# Patient Record
Sex: FEMALE | Race: ASIAN | NOT HISPANIC OR LATINO | Employment: UNEMPLOYED | URBAN - METROPOLITAN AREA
[De-identification: names, ages, dates, MRNs, and addresses within clinical notes are randomized per-mention and may not be internally consistent; named-entity substitution may affect disease eponyms.]

---

## 2017-01-31 ENCOUNTER — OFFICE VISIT (OUTPATIENT)
Dept: URGENT CARE | Facility: CLINIC | Age: 37
End: 2017-01-31
Payer: COMMERCIAL

## 2017-01-31 VITALS
RESPIRATION RATE: 20 BRPM | BODY MASS INDEX: 28.66 KG/M2 | TEMPERATURE: 97.8 F | WEIGHT: 183 LBS | OXYGEN SATURATION: 96 % | DIASTOLIC BLOOD PRESSURE: 80 MMHG | HEART RATE: 78 BPM | SYSTOLIC BLOOD PRESSURE: 120 MMHG

## 2017-01-31 DIAGNOSIS — J01.00 ACUTE NON-RECURRENT MAXILLARY SINUSITIS: ICD-10-CM

## 2017-01-31 PROCEDURE — 99203 OFFICE O/P NEW LOW 30 MIN: CPT | Performed by: FAMILY MEDICINE

## 2017-01-31 RX ORDER — AMOXICILLIN AND CLAVULANATE POTASSIUM 875; 125 MG/1; MG/1
1 TABLET, FILM COATED ORAL 2 TIMES DAILY
Qty: 20 TAB | Refills: 0 | Status: SHIPPED | OUTPATIENT
Start: 2017-01-31 | End: 2017-02-10

## 2017-01-31 ASSESSMENT — ENCOUNTER SYMPTOMS
SINUS PRESSURE: 1
DIZZINESS: 1
CHILLS: 0
SWOLLEN GLANDS: 0
HEADACHES: 1
NECK PAIN: 0
SHORTNESS OF BREATH: 0
HOARSE VOICE: 1
COUGH: 0
NAUSEA: 0
FEVER: 0
SORE THROAT: 0
VOMITING: 0

## 2017-01-31 NOTE — MR AVS SNAPSHOT
Keith Carrasco   2017 4:45 PM   Office Visit   MRN: [id number]    Department:  [facility]   Dept Phone:  [phone]    Description:  Female : 1980   Provider:  Silvestre Parisi M.D.           Reason for Visit     Sinus Problem Few wks stuffy nose , sinus headache comes and goes      Allergies as of 2017     No Known Allergies      You were diagnosed with     Acute non-recurrent maxillary sinusitis   [[id number]]         Vital Signs     Blood Pressure Pulse Temperature Respirations Weight Oxygen Saturation    120/80 mmHg 78 36.6 °C (97.8 °F) 20 83.008 kg (183 lb) 96%    Last Menstrual Period Smoking Status                2010 Never Smoker           Basic Information     Date Of Birth Sex Race Ethnicity Preferred Language    1980 Female  Non- English      Problem List              ICD-10-CM Priority Class Noted - Resolved    History of C/S in [place] for Twin Gestation - Desires Repeat only Z98.891   6/10/2010 - Present    SUPERVISION OF HIGH-RISK PREGNANCY    6/10/2010 - Present    Language barrier, cultural differences - Tanzanian Z60.3   6/10/2010 - Present     delivery at 36wk (twin gestation) O60.10X0   6/10/2010 - Present    History of GDM-A1 O24.419   6/10/2010 - Present      Health Maintenance     Patient has no pending health maintenance at this time      Current Immunizations     Influenza TIV (IM) 1/3/2011  5:07 AM    Tdap Vaccine 1/3/2011  5:07 AM      Below and/or attached are the medications your provider expects you to take. Review all of your home medications and newly ordered medications with your provider and/or pharmacist. Follow medication instructions as directed by your provider and/or pharmacist. Please keep your medication list with you and share with your provider. Update the information when medications are discontinued, doses are changed, or new medications (including over-the-counter products) are added; and carry medication  information at all times in the event of emergency situations     Allergies:  No Known Allergies          Medications  Valid as of: January 31, 2017 -  5:08 PM    Generic Name Brand Name Tablet Size Instructions for use    Amoxicillin-Pot Clavulanate (Tab) AUGMENTIN 875-125 MG Take 1 Tab by mouth 2 times a day for 10 days.        MetroNIDAZOLE (Tab) FLAGYL 500 MG Take 1 Tab by mouth 3 times a day.        Ondansetron HCl (Tab) ZOFRAN 4 MG Take 1 Tab by mouth every 8 hours as needed for Nausea/Vomiting.        Oxycodone-Acetaminophen (Tab) PERCOCET 5-325 MG Take 1-2 Tabs by mouth every four hours as needed.        Pediatric Multivitamins-Iron (Chew Tab) FLINTSTONES PLUS IRON  Take  by mouth.        Prenat w/o H-BM-Acuiq-FA-Omega (Cap) PRENATE ESSENTIAL 28-0.6-0.4-340 MG Take 30 Caps by mouth 1 time daily as needed.        Prenatal Vit-Fe Fumarate-FA (Tab) STUARTNATAL 1+1 27-1 MG Take 1 Tab by mouth every day.        .                 Medicines prescribed today were sent to:     [id number] - [place] [facility] [address] [facility]    [address] [facility] [place] [address]    Phone: [phone] Fax: [phone]    Open 24 Hours?: No      Medication refill instructions:       If your prescription bottle indicates you have medication refills left, it is not necessary to call your provider’s office. Please contact your pharmacy and they will refill your medication.    If your prescription bottle indicates you do not have any refills left, you may request refills at any time through one of the following ways: The online [employer] system (except Urgent Care), by calling your provider’s office, or by asking your pharmacy to contact your provider’s office with a refill request. Medication refills are processed only during regular business hours and may not be available until the next business day. Your provider may request additional information or to have a follow-up visit with you prior to refilling your medication.   *Please  Note: Medication refills are assigned a new Rx number when refilled electronically. Your pharmacy may indicate that no refills were authorized even though a new prescription for the same medication is available at the pharmacy. Please request the medicine by name with the pharmacy before contacting your provider for a refill.           [employer] Access Code: [email or web address]  Expires: 3/2/2017  5:08 PM    Your email address is not on file at [facility].  Email Addresses are required for you to sign up for [employer], please contact [phone] to verify your personal information and to provide your email address prior to attempting to register for [employer].    Keith Carrasco  [address] [place] [address]    [employer]  A secure, online tool to manage your health information     [facility]’s [employer]® is a secure, online tool that connects you to your personalized health information from the privacy of your home -- day or night - making it very easy for you to manage your healthcare. Once the activation process is completed, you can even access your medical information using the [employer] amy, which is available for free in the Apple Amy store or Google Play store.     To learn more about [employer], visit [email or web address]    There are two levels of access available (as shown below):   My Chart Features  [facility]  Specialists [facility]  Urgent  Care Non-[facility] Primary Care Doctor   Email your healthcare team securely and privately 24/7 X X X    Manage appointments: schedule your next appointment; view details of past/upcoming appointments X      Request prescription refills. X      View recent personal medical records, including lab and immunizations X X X X   View health record, including health history, allergies, medications X X X X   Read reports about your outpatient visits, procedures, consult and ER notes X X X X   See your discharge summary, which is a recap of your hospital  and/or ER visit that includes your diagnosis, lab results, and care plan X X  X     How to register for [employer]:  Once your e-mail address has been verified, follow the following steps to sign up for [employer].     1. Go to  [email or web address]  2. Click on the Sign Up Now box, which takes you to the New Member Sign Up page. You will need to provide the following information:  a. Enter your [employer] Access Code exactly as it appears at the top of this page. (You will not need to use this code after you’ve completed the sign-up process. If you do not sign up before the expiration date, you must request a new code.)   b. Enter your date of birth.   c. Enter your home email address.   d. Click Submit, and follow the next screen’s instructions.  3. Create a [employer] ID. This will be your [employer] login ID and cannot be changed, so think of one that is secure and easy to remember.  4. Create a [employer] password. You can change your password at any time.  5. Enter your Password Reset Question and Answer. This can be used at a later time if you forget your password.   6. Enter your e-mail address. This allows you to receive e-mail notifications when new information is available in [employer].  7. Click Sign Up. You can now view your health information.    For assistance activating your [employer] account, call [phone]

## 2017-02-01 NOTE — PROGRESS NOTES
Subjective:      Keith Carrasco is a 36 y.o. female who presents with Sinus Problem            Sinus Problem  This is a new problem. The current episode started 1 to 4 weeks ago (2-3 weeks). The problem is unchanged. There has been no fever. Her pain is at a severity of 3/10. The pain is mild. Associated symptoms include congestion, headaches, a hoarse voice, sinus pressure and sneezing. Pertinent negatives include no chills, coughing, ear pain, neck pain, shortness of breath, sore throat or swollen glands. Treatments tried: claritin. The treatment provided mild relief.       Review of Systems   Constitutional: Negative for fever and chills.   HENT: Positive for congestion, hoarse voice, sinus pressure and sneezing. Negative for ear pain and sore throat.    Respiratory: Negative for cough and shortness of breath.    Gastrointestinal: Negative for nausea and vomiting.   Musculoskeletal: Negative for neck pain.   Skin: Negative for rash.   Neurological: Positive for dizziness and headaches.     PMH:  has a past medical history of Allergy.  MEDS:   Current outpatient prescriptions:   •  oxycodone-acetaminophen (PERCOCET) 5-325 MG TABS, Take 1-2 Tabs by mouth every four hours as needed., Disp: , Rfl:   •  Prenat w/o G-TN-Eygud-FA-Omega (PRENATE ESSENTIAL) 28-0.6-0.4-340 MG CAPS, Take 30 Caps by mouth 1 time daily as needed., Disp: 30 Cap, Rfl: 5  •  metronidazole (FLAGYL) 500 MG TABS, Take 1 Tab by mouth 3 times a day., Disp: 14 Each, Rfl: 0  •  ondansetron (ZOFRAN) 4 MG TABS, Take 1 Tab by mouth every 8 hours as needed for Nausea/Vomiting., Disp: 20 Each, Rfl: 1  •  Pediatric Multivitamins-Iron (FLINTSTONES PLUS IRON) CHEW, Take  by mouth., Disp: , Rfl:   •  prenatal multivitamin (STUARTNATAL 1+1) 27-1 MG TABS, Take 1 Tab by mouth every day., Disp: 90 Each, Rfl: 5  ALLERGIES: No Known Allergies  SURGHX:   Past Surgical History   Procedure Laterality Date   • Primary c section  2007   • Repeat c section  12/30/2010      Performed by LAILA CIFUNETES at [facility]     SOCHX:  reports that she has never smoked. She does not have any smokeless tobacco history on file. She reports that she does not drink alcohol or use illicit drugs.  FH: Family history was reviewed, no pertinent findings to report       Objective:     /80 mmHg  Pulse 78  Temp(Src) 36.6 °C (97.8 °F)  Resp 20  Wt 83.008 kg (183 lb)  SpO2 96%  LMP 04/05/2010     Physical Exam   Constitutional: She appears well-developed.   HENT:   Head: Normocephalic.   Right Ear: External ear normal.   Left Ear: External ear normal.   Mouth/Throat: Oropharyngeal exudate present.   Mild sinus tenderness, Nasal congestion   Eyes: Pupils are equal, round, and reactive to light. Right eye exhibits no discharge. Left eye exhibits no discharge.   Neck: Neck supple. No thyromegaly present.   Cardiovascular: Normal rate.  Exam reveals no friction rub.    No murmur heard.  Pulmonary/Chest: Effort normal. No respiratory distress. She has no wheezes.   Abdominal: Soft. She exhibits no distension. There is no tenderness. There is no guarding.   Lymphadenopathy:     She has no cervical adenopathy.   Neurological: She is alert.   Skin: Skin is warm and dry. No erythema.   Psychiatric: She has a normal mood and affect. Her behavior is normal.               Assessment/Plan:     1. Acute non-recurrent maxillary sinusitis  amoxicillin-clavulanate (AUGMENTIN) 875-125 MG Tab     Patient was given a contingent antibiotic prescription to fill and use as directed if symptoms progressed as discussed and agreed upon.  Try flonase for 72 hrs and take abx if sxs not improving    Supportive care  Push fluids  Monitor temperature  Follow-up if symptoms worsen or fail to improve

## 2018-04-26 ENCOUNTER — OFFICE VISIT (OUTPATIENT)
Dept: MEDICAL GROUP | Age: 38
End: 2018-04-26
Payer: COMMERCIAL

## 2018-04-26 VITALS
BODY MASS INDEX: 29.03 KG/M2 | TEMPERATURE: 98.4 F | WEIGHT: 185 LBS | DIASTOLIC BLOOD PRESSURE: 66 MMHG | OXYGEN SATURATION: 98 % | HEART RATE: 78 BPM | HEIGHT: 67 IN | SYSTOLIC BLOOD PRESSURE: 108 MMHG

## 2018-04-26 DIAGNOSIS — E78.00 PURE HYPERCHOLESTEROLEMIA: ICD-10-CM

## 2018-04-26 DIAGNOSIS — Z00.00 PREVENTATIVE HEALTH CARE: ICD-10-CM

## 2018-04-26 DIAGNOSIS — J30.1 ACUTE SEASONAL ALLERGIC RHINITIS DUE TO POLLEN: ICD-10-CM

## 2018-04-26 PROBLEM — J30.2 ACUTE SEASONAL ALLERGIC RHINITIS: Status: ACTIVE | Noted: 2018-04-26

## 2018-04-26 PROCEDURE — 99213 OFFICE O/P EST LOW 20 MIN: CPT | Performed by: FAMILY MEDICINE

## 2018-04-26 RX ORDER — PREDNISONE 20 MG/1
TABLET ORAL
Qty: 12 TAB | Refills: 0 | Status: SHIPPED
Start: 2018-04-26 | End: 2020-02-15

## 2018-04-26 RX ORDER — OLOPATADINE HYDROCHLORIDE 1 MG/ML
1 SOLUTION/ DROPS OPHTHALMIC 2 TIMES DAILY
Qty: 5 ML | Refills: 2 | Status: SHIPPED
Start: 2018-04-26 | End: 2020-02-15

## 2018-04-26 ASSESSMENT — PATIENT HEALTH QUESTIONNAIRE - PHQ9: CLINICAL INTERPRETATION OF PHQ2 SCORE: 0

## 2018-04-26 NOTE — PROGRESS NOTES
This medical record contains text that has been entered with the assistance of computer voice recognition and dictation software.  Therefore, it may contain unintended errors in text, spelling, punctuation, or grammar    Chief Complaint   Patient presents with   • Establish Care   • Dry Mouth       Keith Carrasco is a 37 y.o. female here evaluation and management of: establish care, seasonal allergies      HPI:     Preventative health care  The patient is a 37-year-old female, , because of twins. She presents [facility] to [facility]. She has a significant past medical history of obesity, and seasonal allergies.   The patient denied any chest pain, no sob, no villarreal, no  pnd, no orthopnea, no headache, no changes in vision, no numbness or tingling, no nausea, no diarrhea, no abdominal pain, no fevers, no chills, no bright red blood per rectum, no  difficulty urinating, no burning during micturition, no depressed mood, no other concerns.      Acute seasonal allergic rhinitis  The patient states that during her menstruation her right eye becomes red and Hurst allergies worsen. She denies any headaches no heavy menstrual bleeding.    Current medicines (including changes today)  Current Outpatient Prescriptions   Medication Sig Dispense Refill   • Loratadine (CLARITIN PO) Take  by mouth.     • olopatadine (PATANOL) 0.1 % ophthalmic solution Place 1 Drop in both eyes 2 times a day. 5 mL 2   • predniSONE (DELTASONE) 20 MG Tab Take 3 tabs po for 2 days then 2 tabs for 2 days then 1 for 2 days 12 Tab 0   • oxycodone-acetaminophen (PERCOCET) 5-325 MG TABS Take 1-2 Tabs by mouth every four hours as needed.     • Prenat w/o I-DI-Oemwt-FA-Omega (PRENATE ESSENTIAL) 28-0.6-0.4-340 MG CAPS Take 30 Caps by mouth 1 time daily as needed. 30 Cap 5   • metronidazole (FLAGYL) 500 MG TABS Take 1 Tab by mouth 3 times a day. 14 Each 0   • ondansetron (ZOFRAN) 4 MG TABS Take 1 Tab by mouth every 8 hours as needed for Nausea/Vomiting. 20  "Each 1   • Pediatric Multivitamins-Iron (FLINTSTONES PLUS IRON) CHEW Take  by mouth.     • prenatal multivitamin (STUARTNATAL 1+1) 27-1 MG TABS Take 1 Tab by mouth every day. 90 Each 5     No current facility-administered medications for this visit.      She  has a past medical history of Allergy.  She  has a past surgical history that includes primary c section (2007) and repeat c section (12/30/2010).  Social History   Substance Use Topics   • Smoking status: Never Smoker   • Smokeless tobacco: Never Used   • Alcohol use No     Social History     Social History Narrative   • No narrative on file     No family history on file.  Family Status   Relation Status   • Mother Alive   • Father Alive         ROS    Please see hpi     All other systems reviewed and are negative     Objective:     Blood pressure 108/66, pulse 78, temperature 36.9 °C (98.4 °F), height 1.702 m (5' 7\"), weight 83.9 kg (185 lb), last menstrual period 04/18/2018, SpO2 98 %, not currently breastfeeding. Body mass index is 28.98 kg/m².  Physical Exam:    Constitutional: Alert, no distress.  Skin: Warm, dry, good turgor, no rashes in visible areas.  Eye: Equal, round and reactive, conjunctiva clear, lids normal.  ENMT: Lips without lesions, good dentition, oropharynx clear.  Neck: Trachea midline, no masses, no thyromegaly. No cervical or supraclavicular lymphadenopathy.  Respiratory: Unlabored respiratory effort, lungs clear to auscultation, no wheezes, no ronchi.  Cardiovascular: Normal S1, S2, no murmur, no edema.  Abdomen: Soft, non-tender, no masses, no hepatosplenomegaly.  Psych: Alert and oriented x3, normal affect and mood.          Assessment and Plan:   The following treatment plan was discussed      1. Preventative health care  Care has been established  We need baseline labs to establish a clinical profile  We reviewed USPSTF guidelines    Requested Medical records to be sent to us  Denies intimate partner viloence        2. Acute " seasonal allergic rhinitis due to pollen  I explained the importance of preventing antibiotic resistance  Educated patient that Washing the nasal cavities with saline reduces postnasal drainage, removes secretions, and rinses away allergens and irritants.  Patient was encouraged to use nasal saline rinses prior to using intranasal steroid  Will add Zyrtec and steroid taper   Oral prednisone  20 mg twice daily for five days, followed by 20 mg daily for five days (ie, total of 10 days of treatment).    If symptoms persist will consider antibiotics  And Leukotriene D4 (LTD4) receptor blockers including montelukast or zafirlukast     - olopatadine (PATANOL) 0.1 % ophthalmic solution; Place 1 Drop in both eyes 2 times a day.  Dispense: 5 mL; Refill: 2  - predniSONE (DELTASONE) 20 MG Tab; Take 3 tabs po for 2 days then 2 tabs for 2 days then 1 for 2 days  Dispense: 12 Tab; Refill: 0    3. Pure hypercholesterolemia  Need new labs    - CBC WITH DIFFERENTIAL; Future  - LIPID PROFILE; Future  - COMP METABOLIC PANEL; Future        HEALTH MAINTENANCE:    Instructed to Follow up in clinic or ER for worsening symptoms, difficulty breathing, lack of expected recovery, or should new symptoms or problems arise.    Followup: Return in about 3 months (around 7/26/2018) for Reevaluation.       Once again this medical record contains text that has been entered with the assistance of computer voice recognition and dictation software.  Therefore, it may contain unintended errors in text, spelling, punctuation, or grammar

## 2018-04-26 NOTE — ASSESSMENT & PLAN NOTE
The patient states that during her menstruation her right eye becomes red and Hurst allergies worsen. She denies any headaches no heavy menstrual bleeding.

## 2018-04-28 ENCOUNTER — HOSPITAL ENCOUNTER (OUTPATIENT)
Dept: LAB | Facility: MEDICAL CENTER | Age: 38
End: 2018-04-28
Attending: FAMILY MEDICINE
Payer: COMMERCIAL

## 2018-04-28 DIAGNOSIS — E78.00 PURE HYPERCHOLESTEROLEMIA: ICD-10-CM

## 2018-04-28 LAB
ALBUMIN SERPL BCP-MCNC: 4.3 G/DL (ref 3.2–4.9)
ALBUMIN/GLOB SERPL: 1.2 G/DL
ALP SERPL-CCNC: 63 U/L (ref 30–99)
ALT SERPL-CCNC: 12 U/L (ref 2–50)
ANION GAP SERPL CALC-SCNC: 6 MMOL/L (ref 0–11.9)
AST SERPL-CCNC: 14 U/L (ref 12–45)
BASOPHILS # BLD AUTO: 0.3 % (ref 0–1.8)
BASOPHILS # BLD: 0.03 K/UL (ref 0–0.12)
BILIRUB SERPL-MCNC: 0.5 MG/DL (ref 0.1–1.5)
BUN SERPL-MCNC: 13 MG/DL (ref 8–22)
CALCIUM SERPL-MCNC: 9.4 MG/DL (ref 8.5–10.5)
CHLORIDE SERPL-SCNC: 106 MMOL/L (ref 96–112)
CHOLEST SERPL-MCNC: 148 MG/DL (ref 100–199)
CO2 SERPL-SCNC: 24 MMOL/L (ref 20–33)
CREAT SERPL-MCNC: 0.72 MG/DL (ref 0.5–1.4)
EOSINOPHIL # BLD AUTO: 0.01 K/UL (ref 0–0.51)
EOSINOPHIL NFR BLD: 0.1 % (ref 0–6.9)
ERYTHROCYTE [DISTWIDTH] IN BLOOD BY AUTOMATED COUNT: 45.4 FL (ref 35.9–50)
GLOBULIN SER CALC-MCNC: 3.5 G/DL (ref 1.9–3.5)
GLUCOSE SERPL-MCNC: 106 MG/DL (ref 65–99)
HCT VFR BLD AUTO: 43.4 % (ref 37–47)
HDLC SERPL-MCNC: 56 MG/DL
HGB BLD-MCNC: 13.7 G/DL (ref 12–16)
IMM GRANULOCYTES # BLD AUTO: 0.03 K/UL (ref 0–0.11)
IMM GRANULOCYTES NFR BLD AUTO: 0.3 % (ref 0–0.9)
LDLC SERPL CALC-MCNC: 85 MG/DL
LYMPHOCYTES # BLD AUTO: 2.35 K/UL (ref 1–4.8)
LYMPHOCYTES NFR BLD: 22.4 % (ref 22–41)
MCH RBC QN AUTO: 29.3 PG (ref 27–33)
MCHC RBC AUTO-ENTMCNC: 31.6 G/DL (ref 33.6–35)
MCV RBC AUTO: 92.7 FL (ref 81.4–97.8)
MONOCYTES # BLD AUTO: 0.54 K/UL (ref 0–0.85)
MONOCYTES NFR BLD AUTO: 5.1 % (ref 0–13.4)
NEUTROPHILS # BLD AUTO: 7.55 K/UL (ref 2–7.15)
NEUTROPHILS NFR BLD: 71.8 % (ref 44–72)
NRBC # BLD AUTO: 0 K/UL
NRBC BLD-RTO: 0 /100 WBC
PLATELET # BLD AUTO: 450 K/UL (ref 164–446)
PMV BLD AUTO: 10.9 FL (ref 9–12.9)
POTASSIUM SERPL-SCNC: 3.9 MMOL/L (ref 3.6–5.5)
PROT SERPL-MCNC: 7.8 G/DL (ref 6–8.2)
RBC # BLD AUTO: 4.68 M/UL (ref 4.2–5.4)
SODIUM SERPL-SCNC: 136 MMOL/L (ref 135–145)
TRIGL SERPL-MCNC: 37 MG/DL (ref 0–149)
WBC # BLD AUTO: 10.5 K/UL (ref 4.8–10.8)

## 2018-04-28 PROCEDURE — 80053 COMPREHEN METABOLIC PANEL: CPT

## 2018-04-28 PROCEDURE — 36415 COLL VENOUS BLD VENIPUNCTURE: CPT

## 2018-04-28 PROCEDURE — 85025 COMPLETE CBC W/AUTO DIFF WBC: CPT

## 2018-04-28 PROCEDURE — 80061 LIPID PANEL: CPT

## 2018-05-02 ENCOUNTER — TELEPHONE (OUTPATIENT)
Dept: MEDICAL GROUP | Age: 38
End: 2018-05-02

## 2018-05-02 NOTE — TELEPHONE ENCOUNTER
----- Message from Billy Bello M.D. sent at 5/2/2018 11:22 AM PDT -----  Please call patient and inform her that she is  in the PRE-Diabetes level also known as impaired fasting glucose (IFG). Changes in lifestyle, including diet modification, weight loss, and exercise slow progression of IFG to overt diabetes. Let's give you 3 months of life some modification and then repeat the labs.    Billy Heck MD  Diplomat, [facility]  [address] [place] [phone]

## 2018-05-02 NOTE — TELEPHONE ENCOUNTER
Phone Number Called: [phone] (home)       Message: left a voice mail to pt    Left Message for patient to call back: yes

## 2018-05-03 NOTE — TELEPHONE ENCOUNTER
Phone Number Called: [phone] (home)       Message: informed pt    Left Message for patient to call back: no

## 2018-10-26 ENCOUNTER — OFFICE VISIT (OUTPATIENT)
Dept: URGENT CARE | Facility: CLINIC | Age: 38
End: 2018-10-26
Payer: COMMERCIAL

## 2018-10-26 VITALS
OXYGEN SATURATION: 100 % | HEART RATE: 77 BPM | RESPIRATION RATE: 16 BRPM | TEMPERATURE: 98.6 F | DIASTOLIC BLOOD PRESSURE: 76 MMHG | SYSTOLIC BLOOD PRESSURE: 112 MMHG | BODY MASS INDEX: 29.19 KG/M2 | HEIGHT: 67 IN | WEIGHT: 186 LBS

## 2018-10-26 DIAGNOSIS — R50.81 FEVER IN OTHER DISEASES: ICD-10-CM

## 2018-10-26 DIAGNOSIS — R10.32 LLQ PAIN: ICD-10-CM

## 2018-10-26 LAB
APPEARANCE UR: CLEAR
BILIRUB UR STRIP-MCNC: NORMAL MG/DL
COLOR UR AUTO: YELLOW
GLUCOSE UR STRIP.AUTO-MCNC: NORMAL MG/DL
KETONES UR STRIP.AUTO-MCNC: NORMAL MG/DL
LEUKOCYTE ESTERASE UR QL STRIP.AUTO: NORMAL
NITRITE UR QL STRIP.AUTO: NORMAL
PH UR STRIP.AUTO: 6 [PH] (ref 5–8)
PROT UR QL STRIP: NORMAL MG/DL
RBC UR QL AUTO: NORMAL
SP GR UR STRIP.AUTO: 1.01
UROBILINOGEN UR STRIP-MCNC: 0.2 MG/DL

## 2018-10-26 PROCEDURE — 99213 OFFICE O/P EST LOW 20 MIN: CPT | Performed by: NURSE PRACTITIONER

## 2018-10-26 PROCEDURE — 81002 URINALYSIS NONAUTO W/O SCOPE: CPT | Performed by: NURSE PRACTITIONER

## 2018-10-26 ASSESSMENT — ENCOUNTER SYMPTOMS
SEIZURES: 0
SHORTNESS OF BREATH: 0
SENSORY CHANGE: 0
LOSS OF CONSCIOUSNESS: 0
CARDIOVASCULAR NEGATIVE: 1
MYALGIAS: 0
EYES NEGATIVE: 1
WHEEZING: 0
DIARRHEA: 0
VOMITING: 0
PHOTOPHOBIA: 0
NECK PAIN: 0
CHILLS: 0
WEAKNESS: 0
CONSTIPATION: 0
NAUSEA: 1
BLURRED VISION: 0
FEVER: 1
HEADACHES: 0
BACK PAIN: 0
RESPIRATORY NEGATIVE: 1
DOUBLE VISION: 0
ABDOMINAL PAIN: 0
SORE THROAT: 0
FOCAL WEAKNESS: 0
DIAPHORESIS: 0
NEUROLOGICAL NEGATIVE: 1
DIZZINESS: 0
FLANK PAIN: 0

## 2018-10-27 NOTE — PROGRESS NOTES
Subjective:   Keith Carrasco is a 38 y.o. female who presents for Fever        HPI   Patient complains of new onset fever and nausea x1 week. She has tried taking OTC Tylenol for fever with some relief. Denies any runny nose, cough, recent colds, sore throat, ear pain or sinus pressure.  States that symptoms have become mildly worse.  She is currently on her menses.  Patient able to tolerate fluids and food.    Denies history of diverticulitis,or colon problems.  Denies any urinary symptoms, such as frequency or burning  She does have a h/o gestational diabetes.    Denies chest pain, shortness of breath or wheezing.  Denies vomiting, diarrhea, or constipation.    Review of Systems   Constitutional: Positive for fever. Negative for chills, diaphoresis and malaise/fatigue.   HENT: Negative for congestion, ear discharge, ear pain, nosebleeds and sore throat.    Eyes: Negative.  Negative for blurred vision, double vision and photophobia.   Respiratory: Negative.  Negative for shortness of breath and wheezing.    Cardiovascular: Negative.  Negative for chest pain.   Gastrointestinal: Positive for nausea. Negative for abdominal pain, constipation, diarrhea and vomiting.   Genitourinary: Negative for dysuria, flank pain, frequency, hematuria and urgency.   Musculoskeletal: Negative for back pain, myalgias and neck pain.   Skin: Negative.  Negative for itching and rash.   Neurological: Negative.  Negative for dizziness, sensory change, focal weakness, seizures, loss of consciousness, weakness and headaches.   All other systems reviewed and are negative.    No Known Allergies   PMH:  Patient Active Problem List    Diagnosis Date Noted   • Preventative health care 04/26/2018   • Acute seasonal allergic rhinitis 04/26/2018   • Pure hypercholesterolemia 04/26/2018   • History of C/S in [place] for Twin Gestation - Desires Repeat only 06/10/2010   • SUPERVISION OF HIGH-RISK PREGNANCY 06/10/2010   • Language barrier, cultural  "differences -  06/10/2010   •  delivery at 36wk (twin gestation) 06/10/2010   • History of GDM-A1 06/10/2010         MEDS:   Current Outpatient Prescriptions:   •  Loratadine (CLARITIN PO), Take  by mouth., Disp: , Rfl:   •  olopatadine (PATANOL) 0.1 % ophthalmic solution, Place 1 Drop in both eyes 2 times a day., Disp: 5 mL, Rfl: 2  •  predniSONE (DELTASONE) 20 MG Tab, Take 3 tabs po for 2 days then 2 tabs for 2 days then 1 for 2 days, Disp: 12 Tab, Rfl: 0  •  oxycodone-acetaminophen (PERCOCET) 5-325 MG TABS, Take 1-2 Tabs by mouth every four hours as needed., Disp: , Rfl:   •  Prenat w/o A-YR-Rhspx-FA-Omega (PRENATE ESSENTIAL) 28-0.6-0.4-340 MG CAPS, Take 30 Caps by mouth 1 time daily as needed., Disp: 30 Cap, Rfl: 5  •  metronidazole (FLAGYL) 500 MG TABS, Take 1 Tab by mouth 3 times a day., Disp: 14 Each, Rfl: 0  •  ondansetron (ZOFRAN) 4 MG TABS, Take 1 Tab by mouth every 8 hours as needed for Nausea/Vomiting., Disp: 20 Each, Rfl: 1  •  Pediatric Multivitamins-Iron (FLINTSTONES PLUS IRON) CHEW, Take  by mouth., Disp: , Rfl:   •  prenatal multivitamin (STUARTNATAL 1+1) 27-1 MG TABS, Take 1 Tab by mouth every day., Disp: 90 Each, Rfl: 5  ALLERGIES: No Known Allergies  SURGHX:   Past Surgical History:   Procedure Laterality Date   • REPEAT C SECTION  2010    Performed by LAILA CIFUENTES at [facility]   • PRIMARY C SECTION       SOCHX:  reports that she has never smoked. She has never used smokeless tobacco. She reports that she does not drink alcohol or use drugs.  FH: Family history was reviewed, no pertinent findings to report     Objective:   /76 (BP Location: Right arm, Patient Position: Sitting, BP Cuff Size: Small adult)   Pulse 77   Temp 37 °C (98.6 °F) (Temporal)   Resp 16   Ht 1.702 m (5' 7\")   Wt 84.4 kg (186 lb)   SpO2 100%   BMI 29.13 kg/m²   Physical Exam   Constitutional: She is oriented to person, place, and time. She appears well-developed and " well-nourished. She is cooperative.  Non-toxic appearance. She does not have a sickly appearance. She does not appear ill. No distress.   HENT:   Head: Normocephalic.   Right Ear: Hearing and tympanic membrane normal. Tympanic membrane is not erythematous. No middle ear effusion.   Left Ear: Hearing and tympanic membrane normal. Tympanic membrane is not erythematous.  No middle ear effusion.   Nose: No mucosal edema or rhinorrhea. Right sinus exhibits no maxillary sinus tenderness and no frontal sinus tenderness. Left sinus exhibits no maxillary sinus tenderness and no frontal sinus tenderness.   Mouth/Throat: Oropharynx is clear and moist and mucous membranes are normal. No posterior oropharyngeal edema or posterior oropharyngeal erythema. No tonsillar exudate.   Eyes: Pupils are equal, round, and reactive to light. Conjunctivae are normal.   Cardiovascular: Normal rate, regular rhythm and normal heart sounds.    No murmur heard.  Pulmonary/Chest: Effort normal and breath sounds normal. No respiratory distress. She has no decreased breath sounds. She has no wheezes.   Abdominal: Soft. Bowel sounds are normal. She exhibits no distension, no pulsatile liver, no fluid wave, no abdominal bruit, no ascites and no mass. There is no hepatosplenomegaly. There is tenderness in the left lower quadrant. There is no rigidity, no rebound, no guarding, no CVA tenderness, no tenderness at McBurney's point and negative Grant's sign. No hernia.   Mild left lower quadrant tenderness upon deep palpation.   Lymphadenopathy:        Head (right side): No submandibular and no tonsillar adenopathy present.        Head (left side): No submandibular and no tonsillar adenopathy present.   Neurological: She is alert and oriented to person, place, and time.   Skin: Skin is warm and dry. Capillary refill takes less than 2 seconds. She is not diaphoretic.   Psychiatric: She has a normal mood and affect. Her behavior is normal. Judgment and  thought content normal. She is not actively hallucinating. Cognition and memory are normal. She is attentive.   Vitals reviewed.        Assessment/Plan:     1. Fever in other diseases  POCT Urinalysis   2. LLQ pain  CT-ABDOMEN-PELVIS WITH    CBC WITH DIFFERENTIAL    COMP METABOLIC PANEL       UA normal.    Due to unavailability of CT scan, would like to patient to go to ER for further workup.  Patient does not want to go to ER and would like to schedule CT outpatient, as she feels this is not urgent. No current signs of acute sepsis    Ordered labs and CT to be completed by Sunday. Will review results with patient.    Keep appointment with PCP on 10/30.    Discussed signs and symptoms of when to go to ER - increasing fever, nausea, vomiting, diarrhea, etc.    Differential diagnosis, natural history, supportive care, and indications for immediate follow-up discussed.     The case was discussed and reviewed with Dr Parrish during Shoshana RAMOS's training period.

## 2018-10-30 ENCOUNTER — OFFICE VISIT (OUTPATIENT)
Dept: MEDICAL GROUP | Age: 38
End: 2018-10-30
Payer: COMMERCIAL

## 2018-10-30 VITALS
WEIGHT: 189 LBS | OXYGEN SATURATION: 98 % | TEMPERATURE: 102.4 F | SYSTOLIC BLOOD PRESSURE: 102 MMHG | HEART RATE: 109 BPM | DIASTOLIC BLOOD PRESSURE: 66 MMHG | BODY MASS INDEX: 29.66 KG/M2 | HEIGHT: 67 IN

## 2018-10-30 DIAGNOSIS — J06.9 VIRAL UPPER RESPIRATORY TRACT INFECTION: ICD-10-CM

## 2018-10-30 DIAGNOSIS — J40 BRONCHITIS: ICD-10-CM

## 2018-10-30 LAB
FLUAV+FLUBV AG SPEC QL IA: NORMAL
INT CON NEG: NEGATIVE
INT CON POS: POSITIVE

## 2018-10-30 PROCEDURE — 99213 OFFICE O/P EST LOW 20 MIN: CPT | Performed by: FAMILY MEDICINE

## 2018-10-30 PROCEDURE — 87804 INFLUENZA ASSAY W/OPTIC: CPT | Performed by: FAMILY MEDICINE

## 2018-10-30 RX ORDER — PREDNISONE 20 MG/1
TABLET ORAL
Qty: 12 TAB | Refills: 0 | Status: SHIPPED
Start: 2018-10-30 | End: 2020-02-15

## 2018-10-30 RX ORDER — IBUPROFEN 400 MG/1
400 TABLET ORAL EVERY 6 HOURS PRN
Qty: 30 TAB | Refills: 0 | Status: SHIPPED | OUTPATIENT
Start: 2018-10-30 | End: 2022-10-20

## 2018-10-30 RX ORDER — AZITHROMYCIN 250 MG/1
TABLET, FILM COATED ORAL
Qty: 6 TAB | Refills: 0 | Status: SHIPPED | OUTPATIENT
Start: 2018-10-30 | End: 2018-11-04

## 2018-10-30 NOTE — ASSESSMENT & PLAN NOTE
The patient states that for the past 2 weeks she has been having fevers of 103 °F associated with generalized malaise.  She also complains of shortness of breath chest congestion and frontal sinus congestion.  She had a cough productive of greenish sputum as well.  She was recently seen in urgent care and was given conservative management she states she has been compliant with this but does not feel any better and stab feels worse.  Continues to have fevers about 103 °F stiffness no numbness or tingling no nausea no vomiting.  She states all of this started after her menstrual cycle.  She did not obtain the flu vaccine this year.

## 2018-10-30 NOTE — PROGRESS NOTES
This medical record contains text that has been entered with the assistance of computer voice recognition and dictation software.  Therefore, it may contain unintended errors in text, spelling, punctuation, or grammar    Chief Complaint   Patient presents with   • Chills     x1.5 weeks   • Headache     x1.5 weeks         Keith Carrasco is a 38 y.o. female here evaluation and management of: feeling sick      HPI:     Bronchitis  The patient states that for the past 2 weeks she has been having fevers of 103 °F associated with generalized malaise.  She also complains of shortness of breath chest congestion and frontal sinus congestion.  She had a cough productive of greenish sputum as well.  She was recently seen in urgent care and was given conservative management she states she has been compliant with this but does not feel any better and stab feels worse.  Continues to have fevers about 103 °F stiffness no numbness or tingling no nausea no vomiting.  She states all of this started after her menstrual cycle.  She did not obtain the flu vaccine this year.    Current medicines (including changes today)  Current Outpatient Prescriptions   Medication Sig Dispense Refill   • azithromycin (ZITHROMAX) 250 MG Tab 2 tabs by mouth day 1, 1 tab by mouth days 2-5 6 Tab 0   • ibuprofen (MOTRIN) 400 MG Tab Take 1 Tab by mouth every 6 hours as needed. 30 Tab 0   • predniSONE (DELTASONE) 20 MG Tab Take 3 tabs po for 2 days then 2 tabs for 2 days then 1 for 2 days 12 Tab 0   • olopatadine (PATANOL) 0.1 % ophthalmic solution Place 1 Drop in both eyes 2 times a day. 5 mL 2   • Loratadine (CLARITIN PO) Take  by mouth.     • predniSONE (DELTASONE) 20 MG Tab Take 3 tabs po for 2 days then 2 tabs for 2 days then 1 for 2 days (Patient not taking: Reported on 10/30/2018) 12 Tab 0   • oxycodone-acetaminophen (PERCOCET) 5-325 MG TABS Take 1-2 Tabs by mouth every four hours as needed.     • Prenat w/o W-RF-Rmktk-FA-Omega (PRENATE ESSENTIAL)  "28-0.6-0.4-340 MG CAPS Take 30 Caps by mouth 1 time daily as needed. 30 Cap 5   • metronidazole (FLAGYL) 500 MG TABS Take 1 Tab by mouth 3 times a day. 14 Each 0   • ondansetron (ZOFRAN) 4 MG TABS Take 1 Tab by mouth every 8 hours as needed for Nausea/Vomiting. 20 Each 1   • Pediatric Multivitamins-Iron (FLINTSTONES PLUS IRON) CHEW Take  by mouth.     • prenatal multivitamin (STUARTNATAL 1+1) 27-1 MG TABS Take 1 Tab by mouth every day. 90 Each 5     No current facility-administered medications for this visit.      She  has a past medical history of Allergy.  She  has a past surgical history that includes primary c section (2007) and repeat c section (12/30/2010).  Social History   Substance Use Topics   • Smoking status: Never Smoker   • Smokeless tobacco: Never Used   • Alcohol use No     Social History     Social History Narrative   • No narrative on file     No family history on file.  Family Status   Relation Status   • Mo Alive   • Fa Alive         ROS    Please see hpi     All other systems reviewed and are negative     Objective:     Blood pressure 102/66, pulse (!) 109, temperature (!) 39.1 °C (102.4 °F), temperature source Temporal, height 1.702 m (5' 7\"), weight 85.7 kg (189 lb), SpO2 98 %, unknown if currently breastfeeding. Body mass index is 29.6 kg/m².  Physical Exam:    Constitutional: Alert, no distress.  Skin: Warm, dry, good turgor, no rashes in visible areas.  Eye: Equal, round and reactive, conjunctiva clear, lids normal.  ENMT: Lips without lesions, good dentition, oropharynx clear.  Neck: Trachea midline, no masses, no thyromegaly. No cervical or supraclavicular lymphadenopathy.  Respiratory: Unlabored respiratory effort, lungs clear to auscultation, no wheezes, no ronchi.  Cardiovascular: Normal S1, S2, no murmur, no edema.  Abdomen: Soft, non-tender, no masses, no hepatosplenomegaly.  Psych: Alert and oriented x3, normal affect and mood.          Assessment and Plan:   The following treatment " plan was discussed    1. Bronchitis    Since symptoms have persisted for 2 weeks  She feels extremely bad  We will proceed with macrolide      - POCT Influenza A/B    - azithromycin (ZITHROMAX) 250 MG Tab; 2 tabs by mouth day 1, 1 tab by mouth days 2-5  Dispense: 6 Tab; Refill: 0  - ibuprofen (MOTRIN) 400 MG Tab; Take 1 Tab by mouth every 6 hours as needed.  Dispense: 30 Tab; Refill: 0  - predniSONE (DELTASONE) 20 MG Tab; Take 3 tabs po for 2 days then 2 tabs for 2 days then 1 for 2 days  Dispense: 12 Tab; Refill: 0        HEALTH MAINTENANCE:    Instructed to Follow up in clinic or ER for worsening symptoms, difficulty breathing, lack of expected recovery, or should new symptoms or problems arise.    Followup: Return in about 4 weeks (around 11/27/2018) for Reevaluation.       Once again this medical record contains text that has been entered with the assistance of computer voice recognition and dictation software.  Therefore, it may contain unintended errors in text, spelling, punctuation, or grammar

## 2020-02-15 ENCOUNTER — OFFICE VISIT (OUTPATIENT)
Dept: URGENT CARE | Facility: CLINIC | Age: 40
End: 2020-02-15

## 2020-02-15 VITALS
RESPIRATION RATE: 16 BRPM | WEIGHT: 180 LBS | SYSTOLIC BLOOD PRESSURE: 106 MMHG | BODY MASS INDEX: 26.66 KG/M2 | OXYGEN SATURATION: 96 % | DIASTOLIC BLOOD PRESSURE: 68 MMHG | TEMPERATURE: 99.2 F | HEART RATE: 124 BPM | HEIGHT: 69 IN

## 2020-02-15 DIAGNOSIS — R68.89 FLU-LIKE SYMPTOMS: Primary | ICD-10-CM

## 2020-02-15 LAB
FLUAV+FLUBV AG SPEC QL IA: NORMAL
INT CON NEG: NORMAL
INT CON POS: NORMAL

## 2020-02-15 PROCEDURE — 99214 OFFICE O/P EST MOD 30 MIN: CPT | Performed by: PHYSICIAN ASSISTANT

## 2020-02-15 PROCEDURE — 87804 INFLUENZA ASSAY W/OPTIC: CPT | Performed by: PHYSICIAN ASSISTANT

## 2020-02-15 RX ORDER — OSELTAMIVIR PHOSPHATE 75 MG/1
75 CAPSULE ORAL 2 TIMES DAILY
Qty: 10 CAP | Refills: 0 | Status: SHIPPED | OUTPATIENT
Start: 2020-02-15 | End: 2020-02-20

## 2020-02-15 ASSESSMENT — ENCOUNTER SYMPTOMS
SORE THROAT: 1
SINUS PAIN: 0
ABDOMINAL PAIN: 0
DIARRHEA: 0
MYALGIAS: 1
CONSTIPATION: 0
NAUSEA: 0
FEVER: 1
VOMITING: 0
COUGH: 1
CHILLS: 1
SPUTUM PRODUCTION: 0
SHORTNESS OF BREATH: 0

## 2020-02-16 NOTE — PATIENT INSTRUCTIONS
Musinex daily   Alternate tylenol and motrin for fever reduction       Influenza, Adult  Influenza, more commonly known as “the flu,” is a viral infection that primarily affects the respiratory tract. The respiratory tract includes organs that help you breathe, such as the lungs, nose, and throat. The flu causes many common cold symptoms, as well as a high fever and body aches.  The flu spreads easily from person to person (is contagious). Getting a flu shot (influenza vaccination) every year is the best way to prevent influenza.  What are the causes?  Influenza is caused by a virus. You can catch the virus by:  · Breathing in droplets from an infected person's cough or sneeze.  · Touching something that was recently contaminated with the virus and then touching your mouth, nose, or eyes.  What increases the risk?  The following factors may make you more likely to get the flu:  · Not cleaning your hands frequently with soap and water or alcohol-based hand .  · Having close contact with many people during cold and flu season.  · Touching your mouth, eyes, or nose without washing or sanitizing your hands first.  · Not drinking enough fluids or not eating a healthy diet.  · Not getting enough sleep or exercise.  · Being under a high amount of stress.  · Not getting a yearly (annual) flu shot.  You may be at a higher risk of complications from the flu, such as a severe lung infection (pneumonia), if you:  · Are over the age of 65.  · Are pregnant.  · Have a weakened disease-fighting system (immune system). You may have a weakened immune system if you:  ¨ Have HIV or AIDS.  ¨ Are undergoing chemotherapy.  ¨ Are taking medicines that reduce the activity of (suppress) the immune system.  · Have a long-term (chronic) illness, such as heart disease, kidney disease, diabetes, or lung disease.  · Have a liver disorder.  · Are obese.  · Have anemia.  What are the signs or symptoms?  Symptoms of this condition typically  last 4-10 days and may include:  · Fever.  · Chills.  · Headache, body aches, or muscle aches.  · Sore throat.  · Cough.  · Runny or congested nose.  · Chest discomfort and cough.  · Poor appetite.  · Weakness or tiredness (fatigue).  · Dizziness.  · Nausea or vomiting.  How is this diagnosed?  This condition may be diagnosed based on your medical history and a physical exam. Your health care provider may do a nose or throat swab test to confirm the diagnosis.  How is this treated?  If influenza is detected early, you can be treated with antiviral medicine that can reduce the length of your illness and the severity of your symptoms. This medicine may be given by mouth (orally) or through an IV tube that is inserted in one of your veins.  The goal of treatment is to relieve symptoms by taking care of yourself at home. This may include taking over-the-counter medicines, drinking plenty of fluids, and adding humidity to the air in your home.  In some cases, influenza goes away on its own. Severe influenza or complications from influenza may be treated in a hospital.  Follow these instructions at home:  · Take over-the-counter and prescription medicines only as told by your health care provider.  · Use a cool mist humidifier to add humidity to the air in your home. This can make breathing easier.  · Rest as needed.  · Drink enough fluid to keep your urine clear or pale yellow.  · Cover your mouth and nose when you cough or sneeze.  · Wash your hands with soap and water often, especially after you cough or sneeze. If soap and water are not available, use hand .  · Stay home from work or school as told by your health care provider. Unless you are visiting your health care provider, try to avoid leaving home until your fever has been gone for 24 hours without the use of medicine.  · Keep all follow-up visits as told by your health care provider. This is important.  How is this prevented?  · Getting an annual flu  shot is the best way to avoid getting the flu. You may get the flu shot in late summer, fall, or winter. Ask your health care provider when you should get your flu shot.  · Wash your hands often or use hand  often.  · Avoid contact with people who are sick during cold and flu season.  · Eat a healthy diet, drink plenty of fluids, get enough sleep, and exercise regularly.  Contact a health care provider if:  · You develop new symptoms.  · You have:  ¨ Chest pain.  ¨ Diarrhea.  ¨ A fever.  · Your cough gets worse.  · You produce more mucus.  · You feel nauseous or you vomit.  Get help right away if:  · You develop shortness of breath or difficulty breathing.  · Your skin or nails turn a bluish color.  · You have severe pain or stiffness in your neck.  · You develop a sudden headache or sudden pain in your face or ear.  · You cannot stop vomiting.  This information is not intended to replace advice given to you by your health care provider. Make sure you discuss any questions you have with your health care provider.  Document Released: 12/15/2001 Document Revised: 05/25/2017 Document Reviewed: 10/11/2016  [employer] Interactive Patient Education © 2017 [employer]

## 2020-02-16 NOTE — PROGRESS NOTES
Subjective:   Keith Carrasco is a 39 y.o. female who presents for Fever (cough, sore throat x1 day )        Cough   This is a new problem. The current episode started today. The problem has been unchanged. The cough is non-productive. Associated symptoms include chills, a fever, myalgias, nasal congestion and a sore throat. Pertinent negatives include no ear congestion, ear pain or shortness of breath. Risk factors: Coworker with URI sx. No flu shot. No recent travel. nonsmoker. Treatments tried: claritin  There is no history of asthma, bronchitis or pneumonia.     Review of Systems   Constitutional: Positive for chills and fever. Negative for malaise/fatigue.   HENT: Positive for congestion and sore throat. Negative for ear pain and sinus pain.    Respiratory: Positive for cough. Negative for sputum production and shortness of breath.    Gastrointestinal: Negative for abdominal pain, constipation, diarrhea, nausea and vomiting.   Musculoskeletal: Positive for myalgias.   All other systems reviewed and are negative.      PMH:  has a past medical history of Allergy.  MEDS:   Current Outpatient Medications:   •  oseltamivir (TAMIFLU) 75 MG Cap, Take 1 Cap by mouth 2 times a day for 5 days., Disp: 10 Cap, Rfl: 0  •  ibuprofen (MOTRIN) 400 MG Tab, Take 1 Tab by mouth every 6 hours as needed., Disp: 30 Tab, Rfl: 0  •  Loratadine (CLARITIN PO), Take  by mouth., Disp: , Rfl:   •  oxycodone-acetaminophen (PERCOCET) 5-325 MG TABS, Take 1-2 Tabs by mouth every four hours as needed., Disp: , Rfl:   •  ondansetron (ZOFRAN) 4 MG TABS, Take 1 Tab by mouth every 8 hours as needed for Nausea/Vomiting. (Patient not taking: Reported on 2/15/2020), Disp: 20 Each, Rfl: 1  •  Pediatric Multivitamins-Iron (FLINTSTONES PLUS IRON) CHEW, Take  by mouth., Disp: , Rfl:   ALLERGIES: No Known Allergies  SURGHX:   Past Surgical History:   Procedure Laterality Date   • REPEAT C SECTION  12/30/2010    Performed by LAILA CIFUENTES at [facility]  "DELIVERY   • PRIMARY C SECTION  2007     SOCHX:  reports that she has never smoked. She has never used smokeless tobacco. She reports that she does not drink alcohol or use drugs.  History reviewed. No pertinent family history.     Objective:   /68   Pulse (!) 124   Temp 37.3 °C (99.2 °F) (Temporal)   Resp 16   Ht 1.753 m (5' 9\")   Wt 81.6 kg (180 lb)   SpO2 96%   BMI 26.58 kg/m²     Physical Exam  Vitals signs reviewed.   Constitutional:       General: She is not in acute distress.     Appearance: She is well-developed.   HENT:      Head: Normocephalic and atraumatic.      Right Ear: Tympanic membrane and external ear normal.      Left Ear: Tympanic membrane and external ear normal.      Nose: Mucosal edema and congestion present.      Mouth/Throat:      Mouth: Mucous membranes are moist.      Pharynx: Oropharynx is clear. Uvula midline.      Tonsils: No tonsillar exudate or tonsillar abscesses. 1+ on the right. 1+ on the left.   Eyes:      Conjunctiva/sclera: Conjunctivae normal.      Pupils: Pupils are equal, round, and reactive to light.   Neck:      Musculoskeletal: Normal range of motion and neck supple. No neck rigidity or muscular tenderness.      Vascular: No carotid bruit.      Trachea: No tracheal deviation.   Cardiovascular:      Rate and Rhythm: Normal rate and regular rhythm.   Pulmonary:      Effort: Pulmonary effort is normal. No respiratory distress.      Breath sounds: Normal breath sounds. No wheezing, rhonchi or rales.   Lymphadenopathy:      Cervical: Cervical adenopathy present.   Skin:     General: Skin is warm and dry.      Capillary Refill: Capillary refill takes less than 2 seconds.   Neurological:      General: No focal deficit present.      Mental Status: She is alert and oriented to person, place, and time.   Psychiatric:         Mood and Affect: Mood normal.         Behavior: Behavior normal.          Influenza: neg       Assessment/Plan:     1. Flu-like symptoms  " oseltamivir (TAMIFLU) 75 MG Cap     Supportive care reviewed.  Influenza handout provided.  Continue to monitor fever closely.  Alternate Tylenol and Motrin for fever reduction.      Follow-up with primary care provider within 7-10 days.  If symptoms worsen or persist patient can return to clinic for reevaluation.  Red flags and emergency room precautions discussed. All side effects of medication discussed including allergic response, GI upset, tendon injury, etc. Patient confirmed understanding of information.    Please note that this dictation was created using voice recognition software. I have made every reasonable attempt to correct obvious errors, but I expect that there are errors of grammar and possibly content that I did not discover before finalizing the note.

## 2020-03-22 ENCOUNTER — HOSPITAL ENCOUNTER (EMERGENCY)
Facility: MEDICAL CENTER | Age: 40
End: 2020-03-22
Attending: EMERGENCY MEDICINE

## 2020-03-22 VITALS
OXYGEN SATURATION: 97 % | HEIGHT: 67 IN | DIASTOLIC BLOOD PRESSURE: 83 MMHG | HEART RATE: 98 BPM | BODY MASS INDEX: 28.88 KG/M2 | RESPIRATION RATE: 16 BRPM | SYSTOLIC BLOOD PRESSURE: 128 MMHG | TEMPERATURE: 98.1 F | WEIGHT: 184 LBS

## 2020-03-22 DIAGNOSIS — R05.9 COUGH: ICD-10-CM

## 2020-03-22 PROCEDURE — 99284 EMERGENCY DEPT VISIT MOD MDM: CPT

## 2020-03-22 ASSESSMENT — LIFESTYLE VARIABLES
DOES PATIENT WANT TO STOP DRINKING: NO
TOTAL SCORE: 0
HAVE PEOPLE ANNOYED YOU BY CRITICIZING YOUR DRINKING: NO
TOTAL SCORE: 0
AVERAGE NUMBER OF DAYS PER WEEK YOU HAVE A DRINK CONTAINING ALCOHOL: 0
HOW MANY TIMES IN THE PAST YEAR HAVE YOU HAD 5 OR MORE DRINKS IN A DAY: 0
EVER HAD A DRINK FIRST THING IN THE MORNING TO STEADY YOUR NERVES TO GET RID OF A HANGOVER: NO
DO YOU DRINK ALCOHOL: NO
TOTAL SCORE: 0
ON A TYPICAL DAY WHEN YOU DRINK ALCOHOL HOW MANY DRINKS DO YOU HAVE: 0
CONSUMPTION TOTAL: NEGATIVE
HAVE YOU EVER FELT YOU SHOULD CUT DOWN ON YOUR DRINKING: NO
EVER FELT BAD OR GUILTY ABOUT YOUR DRINKING: NO

## 2020-03-22 ASSESSMENT — FIBROSIS 4 INDEX: FIB4 SCORE: 0.34

## 2020-03-22 NOTE — ED PROVIDER NOTES
"ED Provider Note    CHIEF COMPLAINT  Chief Complaint   Patient presents with   • Cough   • Sore Throat       Gloves, goggles and surgical mask worn during the encounter.  I remained 6 feet away except for 1 minute to conduct the exam    GUY Carrasco is a 39 y.o. female who presents with a month of cough and intermittent sore throat.  This started in mid February and she went to urgent care was treated with a course of antibiotics which was Tamiflu per chart review.  Chest x-ray was negative for pneumonia.  No history of travel or ill contacts.  No coronavirus exposure.  No asthma or tobacco use.  Symptoms are variable and not worsening.  She went to urgent care today at [facility] urgent [facility] and was told to come here.  No note was written so she was probably referred to the ER by the  staff.  No shortness of breath or fever, rhinorrhea, vomiting or diarrhea.    REVIEW OF SYSTEMS  Pertinent positives include: Cough, sore throat, intermittent after viral syndrome.  Pertinent negatives include: Fever, current sore throat, shortness of breath, vomiting, diarrhea.    PAST MEDICAL HISTORY  Past Medical History:   Diagnosis Date   • Allergy        SOCIAL HISTORY  No tobacco use.    CURRENT MEDICATIONS  Home Medications     Reviewed by Pretty Noonan R.N. (Registered Nurse) on 03/22/20 at 1413  Med List Status: Partial   Medication Last Dose Status   ibuprofen (MOTRIN) 400 MG Tab  Active   Loratadine (CLARITIN PO)  Active   ondansetron (ZOFRAN) 4 MG TABS  Active   oxycodone-acetaminophen (PERCOCET) 5-325 MG TABS  Active   Pediatric Multivitamins-Iron (FLINTSTONES PLUS IRON) CHEW  Active                ALLERGIES  No Known Allergies    PHYSICAL EXAM  VITAL SIGNS: /83   Pulse 98   Temp 36.7 °C (98.1 °F) (Oral)   Resp 16   Ht 1.702 m (5' 7\")   Wt 83.5 kg (184 lb)   SpO2 97%   BMI 28.82 kg/m²   Constitutional :  Well developed, Well nourished, completely well-appearing, afebrile, no tachypnea, no " hypoxia room air.   HNT: Wearing a surgical mask.   Ears: External ears normal.  Eyes: pupils reactive without eye discharge nor conjunctival hyperemia.  Neck: Normal range of motion, No tenderness, Supple, No stridor.   Lymphatic: No adenopathy.   Cardiovascular: Regular rhythm, No murmurs, No rubs, No gallops.  No cyanosis.   Respiratory: No rales, rhonchi, wheeze, rare mild cough  Abdomen:  Soft, nontender  Skin: Warm, dry, no erythema, no rash.   Musculoskeletal: no limb deformities.    RADIOLOGY:  None indicated    LABORATORY: Labs from prior visit reviewed  Results for orders placed or performed in visit on 02/15/20   POCT Influenza A/B   Result Value Ref Range    Rapid Influenza A-B neg     Internal Control Positive Valid     Internal Control Negative Valid          COURSE & MEDICAL DECISION MAKING  Well-appearing patient presents with persistent cough 1 month after respiratory illness.  1 month ago she tested negative for influenza but was treated with Tamiflu anyway.  She has no fever or dyspnea.  This is likely residual post viral cough.  We do not have coronavirus testing available.  She is low risk for coronavirus infection.  There is no evidence of pneumonia or bronchospasm    This patient has borderline or elevated blood pressure as recorded above and was instructed to followup with primary physician for comprehensive blood pressure evaluation and yearly fasting cholesterol assessment according to to CMS protocol.    PLAN:  New Prescriptions    HYDROCODONE-ACETAMINOPHEN 2.5-108 MG/5ML (HYCET) 7.5-325 MG/15ML SOLUTION    Take 10 mL by mouth 4 times a day as needed for Cough (cough) for up to 5 days.       Prescription monitoring queried and score nonexistent  Opiate risk tool utilized and patient low risk  Informed consent obtained for opiate analgesic  Indication opiate analgesic cough    Cough handout given  Return for shortness of breath  Stayed home and self isolate if develops new fever without  shortness of breath.  Self quarantine until fever gone 3 days    CONDITION:  Good.    FINAL IMPRESSION:  1. Cough          Electronically signed by: Amaury Perry M.D., 3/22/2020

## 2020-03-22 NOTE — DISCHARGE INSTRUCTIONS
You have cough which is lingering from an viral URI.  You are low risk for covid.  If you develop fever >100 with cough self isolate until not fever 3 days.  You do not need to see a doctor or return to ER for fever and cough.  If you develop shortness of breath come back to ER.

## 2020-03-22 NOTE — ED TRIAGE NOTES
Chief Complaint   Patient presents with   • Cough   • Sore Throat     Patient ambulatory to the ED. States that she has had a slightly productive cough for 1 or 2 weeks with a sore throat. No recent travel. Mask on patient.

## 2022-10-20 ENCOUNTER — OFFICE VISIT (OUTPATIENT)
Dept: MEDICAL GROUP | Age: 42
End: 2022-10-20
Payer: COMMERCIAL

## 2022-10-20 ENCOUNTER — HOSPITAL ENCOUNTER (OUTPATIENT)
Dept: LAB | Facility: MEDICAL CENTER | Age: 42
End: 2022-10-20
Attending: FAMILY MEDICINE
Payer: COMMERCIAL

## 2022-10-20 VITALS
OXYGEN SATURATION: 98 % | HEIGHT: 66 IN | TEMPERATURE: 96.9 F | RESPIRATION RATE: 16 BRPM | WEIGHT: 217 LBS | HEART RATE: 78 BPM | BODY MASS INDEX: 34.87 KG/M2 | DIASTOLIC BLOOD PRESSURE: 64 MMHG | SYSTOLIC BLOOD PRESSURE: 122 MMHG

## 2022-10-20 DIAGNOSIS — E78.00 PURE HYPERCHOLESTEROLEMIA: ICD-10-CM

## 2022-10-20 DIAGNOSIS — E55.9 VITAMIN D DEFICIENCY: ICD-10-CM

## 2022-10-20 DIAGNOSIS — Z00.00 ENCOUNTER FOR MEDICAL EXAMINATION TO ESTABLISH CARE: ICD-10-CM

## 2022-10-20 DIAGNOSIS — Z00.00 ANNUAL PHYSICAL EXAM: ICD-10-CM

## 2022-10-20 DIAGNOSIS — Z12.31 ENCOUNTER FOR SCREENING MAMMOGRAM FOR BREAST CANCER: ICD-10-CM

## 2022-10-20 LAB
25(OH)D3 SERPL-MCNC: 12 NG/ML (ref 30–100)
ALBUMIN SERPL BCP-MCNC: 4.2 G/DL (ref 3.2–4.9)
ALBUMIN/GLOB SERPL: 1.5 G/DL
ALP SERPL-CCNC: 69 U/L (ref 30–99)
ALT SERPL-CCNC: 15 U/L (ref 2–50)
ANION GAP SERPL CALC-SCNC: 10 MMOL/L (ref 7–16)
AST SERPL-CCNC: 15 U/L (ref 12–45)
BASOPHILS # BLD AUTO: 0.8 % (ref 0–1.8)
BASOPHILS # BLD: 0.05 K/UL (ref 0–0.12)
BILIRUB SERPL-MCNC: 0.3 MG/DL (ref 0.1–1.5)
BUN SERPL-MCNC: 13 MG/DL (ref 8–22)
CALCIUM SERPL-MCNC: 9 MG/DL (ref 8.5–10.5)
CHLORIDE SERPL-SCNC: 105 MMOL/L (ref 96–112)
CHOLEST SERPL-MCNC: 151 MG/DL (ref 100–199)
CO2 SERPL-SCNC: 24 MMOL/L (ref 20–33)
CREAT SERPL-MCNC: 0.72 MG/DL (ref 0.5–1.4)
EOSINOPHIL # BLD AUTO: 1 K/UL (ref 0–0.51)
EOSINOPHIL NFR BLD: 16.4 % (ref 0–6.9)
ERYTHROCYTE [DISTWIDTH] IN BLOOD BY AUTOMATED COUNT: 47.3 FL (ref 35.9–50)
FASTING STATUS PATIENT QL REPORTED: NORMAL
GFR SERPLBLD CREATININE-BSD FMLA CKD-EPI: 108 ML/MIN/1.73 M 2
GLOBULIN SER CALC-MCNC: 2.8 G/DL (ref 1.9–3.5)
GLUCOSE SERPL-MCNC: 86 MG/DL (ref 65–99)
HCT VFR BLD AUTO: 42.8 % (ref 37–47)
HDLC SERPL-MCNC: 51 MG/DL
HGB BLD-MCNC: 13.6 G/DL (ref 12–16)
IMM GRANULOCYTES # BLD AUTO: 0.02 K/UL (ref 0–0.11)
IMM GRANULOCYTES NFR BLD AUTO: 0.3 % (ref 0–0.9)
LDLC SERPL CALC-MCNC: 90 MG/DL
LYMPHOCYTES # BLD AUTO: 2.16 K/UL (ref 1–4.8)
LYMPHOCYTES NFR BLD: 35.4 % (ref 22–41)
MCH RBC QN AUTO: 30.2 PG (ref 27–33)
MCHC RBC AUTO-ENTMCNC: 31.8 G/DL (ref 33.6–35)
MCV RBC AUTO: 94.9 FL (ref 81.4–97.8)
MONOCYTES # BLD AUTO: 0.37 K/UL (ref 0–0.85)
MONOCYTES NFR BLD AUTO: 6.1 % (ref 0–13.4)
NEUTROPHILS # BLD AUTO: 2.51 K/UL (ref 2–7.15)
NEUTROPHILS NFR BLD: 41 % (ref 44–72)
NRBC # BLD AUTO: 0 K/UL
NRBC BLD-RTO: 0 /100 WBC
PLATELET # BLD AUTO: 430 K/UL (ref 164–446)
PMV BLD AUTO: 10.5 FL (ref 9–12.9)
POTASSIUM SERPL-SCNC: 4.2 MMOL/L (ref 3.6–5.5)
PROT SERPL-MCNC: 7 G/DL (ref 6–8.2)
RBC # BLD AUTO: 4.51 M/UL (ref 4.2–5.4)
SODIUM SERPL-SCNC: 139 MMOL/L (ref 135–145)
T3FREE SERPL-MCNC: 3.04 PG/ML (ref 2–4.4)
T4 FREE SERPL-MCNC: 0.98 NG/DL (ref 0.93–1.7)
TRIGL SERPL-MCNC: 50 MG/DL (ref 0–149)
TSH SERPL DL<=0.005 MIU/L-ACNC: 2.51 UIU/ML (ref 0.38–5.33)
WBC # BLD AUTO: 6.1 K/UL (ref 4.8–10.8)

## 2022-10-20 PROCEDURE — 82306 VITAMIN D 25 HYDROXY: CPT

## 2022-10-20 PROCEDURE — 80061 LIPID PANEL: CPT

## 2022-10-20 PROCEDURE — 99204 OFFICE O/P NEW MOD 45 MIN: CPT | Performed by: FAMILY MEDICINE

## 2022-10-20 PROCEDURE — 84439 ASSAY OF FREE THYROXINE: CPT

## 2022-10-20 PROCEDURE — 84481 FREE ASSAY (FT-3): CPT

## 2022-10-20 PROCEDURE — 36415 COLL VENOUS BLD VENIPUNCTURE: CPT

## 2022-10-20 PROCEDURE — 84443 ASSAY THYROID STIM HORMONE: CPT

## 2022-10-20 PROCEDURE — 80053 COMPREHEN METABOLIC PANEL: CPT

## 2022-10-20 PROCEDURE — 85025 COMPLETE CBC W/AUTO DIFF WBC: CPT

## 2022-10-20 RX ORDER — PHENTERMINE HYDROCHLORIDE 37.5 MG/1
37.5 TABLET ORAL
Qty: 30 TABLET | Refills: 0 | Status: SHIPPED | OUTPATIENT
Start: 2022-10-20 | End: 2022-11-19

## 2022-10-20 ASSESSMENT — PATIENT HEALTH QUESTIONNAIRE - PHQ9: CLINICAL INTERPRETATION OF PHQ2 SCORE: 0

## 2022-10-20 NOTE — PROGRESS NOTES
This medical record contains text that has been entered with the assistance of computer voice recognition and dictation software.  Therefore, it may contain unintended errors in text, spelling, punctuation, or grammar      Chief Complaint   Patient presents with    Establish Care         Keith Carrasco is a 42 y.o. female here evaluation and management of: Establish [facility] [place]      HPI:     HCC Gap Form    Diagnosis: E66.01 - Morbid (severe) obesity due to excess calories (HCC)  Z68.35 - Body mass index (BMI) 35.0-35.9, adult  Comorbidity Diagnosis: Bronchitis  The current BMI is 35.02 kg/m2 as of 10/20/22 07:16 PDT  Assessment and plan: Chronic, stable. Encouraged healthy diet and physical activity changes with a goal of weight loss. Follow up at least annually. The comorbid condition is asymptomatic based on today's assessment. Continue current treatment plan. Follow up in 4 months.  Last edited 10/20/22 07:17 PDT by Billy Heck M.D.           1. Encounter for medical examination to establish care    The patient is a very pleasant 42-year-old female, she presents to clinic to reestablish care.  She has a significant past medical history of morbid obesity.    Today the patient denied any fevers, chills, headaches, nausea, vomiting, changes in vision, shortness of breath, back pain, chest pain, nausea or vomiting, change in taste or smell, new rashes, joint pain, blood in stool dark tarry stool.      Past medical history see above below    Family History of Cancer---none    Family History of early CAD (female for the age of 65, or a male before the age of 55 )---none      Social History        Occupation----[employer]    Exercise---gym 3 times, and walks      Pets---none    Favorite sports team--- none        2. BMI 35.0-35.9,adult  The patient states that she eats only 1 meal a day usually at night and she cannot understand why she keeps losing weight.  This is very frustrating.  She is going to the gym 4 times per  "week, and walks 3 times weekly 1 mile per session    3. Encounter for screening mammogram for breast cancer  Due for breast cancer screening      4. Annual labs  Due for annual lab      Current medicines (including changes today)  Current Outpatient Medications   Medication Sig Dispense Refill    phentermine (ADIPEX-P) 37.5 MG tablet Take 1 Tablet by mouth every morning before breakfast for 30 days. 30 Tablet 0    ondansetron (ZOFRAN) 4 MG TABS Take 1 Tab by mouth every 8 hours as needed for Nausea/Vomiting. 20 Each 1    ibuprofen (MOTRIN) 400 MG Tab Take 1 Tab by mouth every 6 hours as needed. (Patient not taking: Reported on 10/20/2022) 30 Tab 0    Loratadine (CLARITIN PO) Take  by mouth. (Patient not taking: Reported on 10/20/2022)      oxyCODONE-acetaminophen (PERCOCET) 5-325 MG Tab Take 1-2 Tabs by mouth every four hours as needed. (Patient not taking: Reported on 10/20/2022)      Pediatric Multivitamins-Iron (FLINTSTONES PLUS IRON) CHEW Take  by mouth. (Patient not taking: Reported on 10/20/2022)       No current facility-administered medications for this visit.     She  has a past medical history of Allergy.  She  has a past surgical history that includes primary c section (2007) and repeat c section (12/30/2010).  Social History     Tobacco Use    Smoking status: Never    Smokeless tobacco: Never   Vaping Use    Vaping Use: Never used   Substance Use Topics    Alcohol use: No    Drug use: No     Social History     Social History Narrative    Not on file     History reviewed. No pertinent family history.  Family Status   Relation Name Status    Mo  Alive    Fa  Alive         ROS    The pertinent  ROS findings can be seen in the HPI above.     All other systems reviewed and are negative     Objective:     /64 (BP Location: Right arm, Patient Position: Sitting, BP Cuff Size: Adult long)   Pulse 78   Temp 36.1 °C (96.9 °F) (Temporal)   Resp 16   Ht 1.676 m (5' 6\")   Wt 98.4 kg (217 lb)   SpO2 98%  " Body mass index is 35.02 kg/m².      Physical Exam:    Constitutional: Alert, no distress.  Skin: No suspicious lesions  Eye: Equal, round and reactive, conjunctiva clear, lids normal.  ENMT: Lips without lesions, good dentition, oropharynx clear.  Neck: Trachea midline, no masses, no thyromegaly. No cervical or supraclavicular lymphadenopathy.  Respiratory: Unlabored respiratory effort, lungs clear to auscultation, no wheezes, no ronchi.  Cardiovascular: Normal S1, S2, no murmur, no edema  Abdomen: Soft, non-tender, no masses, no hepatosplenomegaly.        Assessment and Plan:   The following treatment plan was discussed    1. Encounter for medical examination to establish care  Care has been established  We need  updated l abs to establish a clinical profile    Age-appropriate preventive services recommended by USPTF guidelines and ACIP were discussed today.    Requested any outside Medical records to be sent to us  Denies intimate partner viloence  Discussed seat belt safety      2. BMI 35.0-35.9,adult    Starting weight --217 pounds on October 20, 2022      We discussed all side effects of phentermine including but not limited to Hypertension, ischemia, palpitations, tachycardia, Dizziness, dysphoria, euphoria, headache, insomnia, overstimulation, psychosis, restlessness, Urticaria, Change in libido, Constipation, diarrhea, gastrointestinal distress, unpleasant taste, xerostomi,  Impotence   Acquired valvular heart disease (regurgitant), primary pulmonary hypertension      3. Encounter for screening mammogram for breast cancer  Due for breast cancer screening      4. Annual labs    We will obtain new labs to update clinical profile.  Then we will adjust therapy as needed.      She is also due for a Pap smear    Instructed to Follow up in clinic or ER for worsening symptoms, difficulty breathing, lack of expected recovery, or should new symptoms or problems arise.    Followup: Return in about 6 months (around  4/20/2023) for Reevaluation, labs.

## 2022-11-22 ENCOUNTER — OFFICE VISIT (OUTPATIENT)
Dept: MEDICAL GROUP | Age: 42
End: 2022-11-22
Payer: COMMERCIAL

## 2022-11-22 VITALS
HEIGHT: 66 IN | SYSTOLIC BLOOD PRESSURE: 122 MMHG | DIASTOLIC BLOOD PRESSURE: 64 MMHG | WEIGHT: 213.7 LBS | TEMPERATURE: 96.9 F | OXYGEN SATURATION: 96 % | BODY MASS INDEX: 34.34 KG/M2 | HEART RATE: 94 BPM | RESPIRATION RATE: 16 BRPM

## 2022-11-22 DIAGNOSIS — N92.6 ABNORMAL MENSTRUAL PERIODS: ICD-10-CM

## 2022-11-22 PROCEDURE — 99214 OFFICE O/P EST MOD 30 MIN: CPT | Performed by: FAMILY MEDICINE

## 2022-11-22 RX ORDER — ERGOCALCIFEROL 1.25 MG/1
CAPSULE ORAL
COMMUNITY

## 2022-11-22 RX ORDER — PHENTERMINE HYDROCHLORIDE 37.5 MG/1
37.5 CAPSULE ORAL EVERY MORNING
Qty: 90 CAPSULE | Refills: 0 | Status: SHIPPED | OUTPATIENT
Start: 2022-11-22 | End: 2023-02-20

## 2022-11-22 RX ORDER — PHENTERMINE HYDROCHLORIDE 37.5 MG/1
37.5 CAPSULE ORAL EVERY MORNING
COMMUNITY
End: 2022-11-22 | Stop reason: SDUPTHER

## 2022-11-22 ASSESSMENT — FIBROSIS 4 INDEX: FIB4 SCORE: 0.37

## 2022-11-22 NOTE — PROGRESS NOTES
"This medical record contains text that has been entered with the assistance of computer voice recognition and dictation software.  Therefore, it may contain unintended errors in text, spelling, punctuation, or grammar      Chief Complaint   Patient presents with    Follow-Up    Weight Check         Keith Carrasco is a 42 y.o. female here evaluation and management of: weight management      HPI:           1. BMI 35.0-35.9,adult    The patient was started on phentermine 1 month ago and she has lost close to 5 pounds.  She states that she had a little bit anxiety the first day after that she was totally fine.  No other adverse side effects.  She denies any palpitations, no difficulty sleeping.  She would like to continue.     10/20/2022  7:09 AM 11/22/2022  8:09 AM   Vitals     Weight 217  213.7    Height 1.676 m (5' 6\")  1.676 m (5' 6\")    BMI 35.02 kg/m2  34.49 kg/m2          2. Abnormal menstrual periods    Patient states she is status post tubal ligation but she has not had a menstrual cycle in 2 months.  She states there is no way that she could be pregnant.  She denies any abdominal pain no vaginal bleeding no nausea or vomiting no fevers chills night sweats.    Current medicines (including changes today)  Current Outpatient Medications   Medication Sig Dispense Refill    vitamin D2, Ergocalciferol, (DRISDOL) 1.25 MG (36573 UT) Cap capsule Take  by mouth every 7 days.      phentermine 37.5 MG capsule Take 1 Capsule by mouth every morning for 90 days. 90 Capsule 0     No current facility-administered medications for this visit.     She  has a past medical history of Allergy.  She  has a past surgical history that includes primary c section (2007) and repeat c section (12/30/2010).  Social History     Tobacco Use    Smoking status: Never    Smokeless tobacco: Never   Vaping Use    Vaping Use: Never used   Substance Use Topics    Alcohol use: No    Drug use: No     Social History     Social History Narrative    Not " "on file     History reviewed. No pertinent family history.  Family Status   Relation Name Status    Mo  Alive    Fa  Alive         ROS    The pertinent  ROS findings can be seen in the HPI above.     All other systems reviewed and are negative     Objective:     /64 (BP Location: Right arm, Patient Position: Sitting, BP Cuff Size: Adult long)   Pulse 94   Temp 36.1 °C (96.9 °F) (Temporal)   Resp 16   Ht 1.676 m (5' 6\")   Wt 96.9 kg (213 lb 11.2 oz)   SpO2 96%  Body mass index is 34.49 kg/m².      Physical Exam:    Constitutional: Alert, no distress.  Skin: No suspicious lesions  Eye: Equal, round and reactive, conjunctiva clear, lids normal.  ENMT: Lips without lesions, good dentition, oropharynx clear.  Neck: Trachea midline, no masses, no thyromegaly. No cervical or supraclavicular lymphadenopathy.  Respiratory: Unlabored respiratory effort, lungs clear to auscultation, no wheezes, no ronchi.  Cardiovascular: Normal S1, S2, no murmur, no edema  Abdomen: Soft, non-tender, no masses, no hepatosplenomegaly.        Assessment and Plan:   The following treatment plan was discussed      1. BMI 35.0-35.9,adult    Starting weight --217 pounds on October 20, 2022    Today's weight --213 pounds    We discussed all side effects of phentermine including but not limited to Hypertension, ischemia, palpitations, tachycardia, Dizziness, dysphoria, euphoria, headache, insomnia, overstimulation, psychosis, restlessness, Urticaria, Change in libido, Constipation, diarrhea, gastrointestinal distress, unpleasant taste, xerostomi,  Impotence   Acquired valvular heart disease (regurgitant), primary pulmonary hypertension   - phentermine 37.5 MG capsule; Take 1 Capsule by mouth every morning for 90 days.  Dispense: 90 Capsule; Refill: 0    2. Abnormal menstrual periods    Obtain a pelvic ultrasound obtain hCG   prior to establishing care and gynecology    - Referral to Gynecology  - US-PELVIC COMPLETE " (TRANSABDOMINAL/TRANSVAGINAL) (COMBO); Future  - HCG QUAL SERUM; Future    Other orders  - vitamin D2, Ergocalciferol, (DRISDOL) 1.25 MG (29504 UT) Cap capsule; Take  by mouth every 7 days.             Instructed to Follow up in clinic or ER for worsening symptoms, difficulty breathing, lack of expected recovery, or should new symptoms or problems arise.    Followup: Return in about 3 months (around 2/22/2023) for Medication refill.

## 2022-11-29 ENCOUNTER — HOSPITAL ENCOUNTER (OUTPATIENT)
Dept: RADIOLOGY | Facility: MEDICAL CENTER | Age: 42
End: 2022-11-29
Attending: FAMILY MEDICINE
Payer: COMMERCIAL

## 2022-11-29 DIAGNOSIS — Z12.31 ENCOUNTER FOR SCREENING MAMMOGRAM FOR BREAST CANCER: ICD-10-CM

## 2022-11-29 PROCEDURE — 77063 BREAST TOMOSYNTHESIS BI: CPT

## 2022-12-04 ENCOUNTER — OFFICE VISIT (OUTPATIENT)
Dept: URGENT CARE | Facility: CLINIC | Age: 42
End: 2022-12-04
Payer: COMMERCIAL

## 2022-12-04 ENCOUNTER — APPOINTMENT (OUTPATIENT)
Dept: RADIOLOGY | Facility: MEDICAL CENTER | Age: 42
End: 2022-12-04
Attending: EMERGENCY MEDICINE
Payer: COMMERCIAL

## 2022-12-04 ENCOUNTER — HOSPITAL ENCOUNTER (EMERGENCY)
Facility: MEDICAL CENTER | Age: 42
End: 2022-12-04
Attending: EMERGENCY MEDICINE
Payer: COMMERCIAL

## 2022-12-04 VITALS
HEART RATE: 107 BPM | RESPIRATION RATE: 18 BRPM | DIASTOLIC BLOOD PRESSURE: 82 MMHG | WEIGHT: 210 LBS | TEMPERATURE: 98 F | HEIGHT: 67 IN | BODY MASS INDEX: 32.96 KG/M2 | SYSTOLIC BLOOD PRESSURE: 114 MMHG | OXYGEN SATURATION: 98 %

## 2022-12-04 VITALS
HEART RATE: 88 BPM | TEMPERATURE: 97.8 F | DIASTOLIC BLOOD PRESSURE: 80 MMHG | BODY MASS INDEX: 32.87 KG/M2 | WEIGHT: 209.44 LBS | RESPIRATION RATE: 16 BRPM | SYSTOLIC BLOOD PRESSURE: 116 MMHG | OXYGEN SATURATION: 100 % | HEIGHT: 67 IN

## 2022-12-04 DIAGNOSIS — N93.8 DYSFUNCTIONAL UTERINE BLEEDING: ICD-10-CM

## 2022-12-04 DIAGNOSIS — G44.209 TENSION HEADACHE: ICD-10-CM

## 2022-12-04 DIAGNOSIS — R93.89 ENDOMETRIAL THICKENING ON ULTRASOUND: ICD-10-CM

## 2022-12-04 DIAGNOSIS — N93.9 VAGINAL BLEEDING: ICD-10-CM

## 2022-12-04 LAB
ABO GROUP BLD: NORMAL
ALBUMIN SERPL BCP-MCNC: 4.3 G/DL (ref 3.2–4.9)
ALBUMIN/GLOB SERPL: 1.4 G/DL
ALP SERPL-CCNC: 72 U/L (ref 30–99)
ALT SERPL-CCNC: 17 U/L (ref 2–50)
ANION GAP SERPL CALC-SCNC: 9 MMOL/L (ref 7–16)
APPEARANCE UR: ABNORMAL
AST SERPL-CCNC: 18 U/L (ref 12–45)
BACTERIA #/AREA URNS HPF: ABNORMAL /HPF
BASOPHILS # BLD AUTO: 0.7 % (ref 0–1.8)
BASOPHILS # BLD: 0.06 K/UL (ref 0–0.12)
BILIRUB SERPL-MCNC: 0.2 MG/DL (ref 0.1–1.5)
BILIRUB UR QL STRIP.AUTO: NEGATIVE
BLD GP AB SCN SERPL QL: NORMAL
BUN SERPL-MCNC: 8 MG/DL (ref 8–22)
CALCIUM SERPL-MCNC: 9.3 MG/DL (ref 8.4–10.2)
CHLORIDE SERPL-SCNC: 104 MMOL/L (ref 96–112)
CO2 SERPL-SCNC: 25 MMOL/L (ref 20–33)
COLOR UR: ABNORMAL
CREAT SERPL-MCNC: 0.66 MG/DL (ref 0.5–1.4)
EKG IMPRESSION: NORMAL
EOSINOPHIL # BLD AUTO: 0.9 K/UL (ref 0–0.51)
EOSINOPHIL NFR BLD: 10.2 % (ref 0–6.9)
EPI CELLS #/AREA URNS HPF: ABNORMAL /HPF
ERYTHROCYTE [DISTWIDTH] IN BLOOD BY AUTOMATED COUNT: 44.8 FL (ref 35.9–50)
GFR SERPLBLD CREATININE-BSD FMLA CKD-EPI: 113 ML/MIN/1.73 M 2
GLOBULIN SER CALC-MCNC: 3 G/DL (ref 1.9–3.5)
GLUCOSE SERPL-MCNC: 96 MG/DL (ref 65–99)
GLUCOSE UR STRIP.AUTO-MCNC: 100 MG/DL
HCG SERPL QL: NEGATIVE
HCT VFR BLD AUTO: 36.2 % (ref 37–47)
HGB BLD-MCNC: 11.8 G/DL (ref 12–16)
IMM GRANULOCYTES # BLD AUTO: 0.02 K/UL (ref 0–0.11)
IMM GRANULOCYTES NFR BLD AUTO: 0.2 % (ref 0–0.9)
KETONES UR STRIP.AUTO-MCNC: 15 MG/DL
LEUKOCYTE ESTERASE UR QL STRIP.AUTO: ABNORMAL
LYMPHOCYTES # BLD AUTO: 3.35 K/UL (ref 1–4.8)
LYMPHOCYTES NFR BLD: 37.8 % (ref 22–41)
MCH RBC QN AUTO: 30.1 PG (ref 27–33)
MCHC RBC AUTO-ENTMCNC: 32.6 G/DL (ref 33.6–35)
MCV RBC AUTO: 92.3 FL (ref 81.4–97.8)
MICRO URNS: ABNORMAL
MONOCYTES # BLD AUTO: 0.44 K/UL (ref 0–0.85)
MONOCYTES NFR BLD AUTO: 5 % (ref 0–13.4)
NEUTROPHILS # BLD AUTO: 4.09 K/UL (ref 2–7.15)
NEUTROPHILS NFR BLD: 46.1 % (ref 44–72)
NITRITE UR QL STRIP.AUTO: POSITIVE
NRBC # BLD AUTO: 0 K/UL
NRBC BLD-RTO: 0 /100 WBC
PH UR STRIP.AUTO: 7 [PH] (ref 5–8)
PLATELET # BLD AUTO: 410 K/UL (ref 164–446)
PMV BLD AUTO: 10.2 FL (ref 9–12.9)
POTASSIUM SERPL-SCNC: 3.9 MMOL/L (ref 3.6–5.5)
PROT SERPL-MCNC: 7.3 G/DL (ref 6–8.2)
PROT UR QL STRIP: >=300 MG/DL
RBC # BLD AUTO: 3.92 M/UL (ref 4.2–5.4)
RBC # URNS HPF: >150 /HPF
RBC UR QL AUTO: ABNORMAL
RH BLD: NORMAL
SODIUM SERPL-SCNC: 138 MMOL/L (ref 135–145)
SP GR UR STRIP.AUTO: 1.01
WBC # BLD AUTO: 8.9 K/UL (ref 4.8–10.8)
WBC #/AREA URNS HPF: ABNORMAL /HPF

## 2022-12-04 PROCEDURE — 99284 EMERGENCY DEPT VISIT MOD MDM: CPT

## 2022-12-04 PROCEDURE — 36415 COLL VENOUS BLD VENIPUNCTURE: CPT

## 2022-12-04 PROCEDURE — 71045 X-RAY EXAM CHEST 1 VIEW: CPT

## 2022-12-04 PROCEDURE — 99215 OFFICE O/P EST HI 40 MIN: CPT

## 2022-12-04 PROCEDURE — 76856 US EXAM PELVIC COMPLETE: CPT

## 2022-12-04 PROCEDURE — 80053 COMPREHEN METABOLIC PANEL: CPT

## 2022-12-04 PROCEDURE — 81001 URINALYSIS AUTO W/SCOPE: CPT

## 2022-12-04 PROCEDURE — 86850 RBC ANTIBODY SCREEN: CPT

## 2022-12-04 PROCEDURE — 93005 ELECTROCARDIOGRAM TRACING: CPT

## 2022-12-04 PROCEDURE — 700102 HCHG RX REV CODE 250 W/ 637 OVERRIDE(OP): Performed by: EMERGENCY MEDICINE

## 2022-12-04 PROCEDURE — 86901 BLOOD TYPING SEROLOGIC RH(D): CPT

## 2022-12-04 PROCEDURE — 86900 BLOOD TYPING SEROLOGIC ABO: CPT

## 2022-12-04 PROCEDURE — 93005 ELECTROCARDIOGRAM TRACING: CPT | Performed by: EMERGENCY MEDICINE

## 2022-12-04 PROCEDURE — 85025 COMPLETE CBC W/AUTO DIFF WBC: CPT

## 2022-12-04 PROCEDURE — 84703 CHORIONIC GONADOTROPIN ASSAY: CPT

## 2022-12-04 PROCEDURE — A9270 NON-COVERED ITEM OR SERVICE: HCPCS | Performed by: EMERGENCY MEDICINE

## 2022-12-04 RX ORDER — ACETAMINOPHEN 325 MG/1
650 TABLET ORAL ONCE
Status: COMPLETED | OUTPATIENT
Start: 2022-12-04 | End: 2022-12-04

## 2022-12-04 RX ADMIN — ACETAMINOPHEN 650 MG: 325 TABLET, FILM COATED ORAL at 21:25

## 2022-12-04 ASSESSMENT — PAIN DESCRIPTION - PAIN TYPE: TYPE: ACUTE PAIN

## 2022-12-04 ASSESSMENT — FIBROSIS 4 INDEX
FIB4 SCORE: 0.37
FIB4 SCORE: 0.37

## 2022-12-05 NOTE — ED NOTES
Pt AO4, sitting up in gurney, nad noted.   Pt reporting 10 day long period, normal length is 2-3 days. Denies any pain or cramping. Pt felt a little dizzy earlier today at  but none currently.   Safety precautions in place including gurney locked in lowest position, both side rails up, call light within reach. Pt educated to use call light if requiring any assistance with getting oob.

## 2022-12-05 NOTE — PROGRESS NOTES
Subjective:   Keith Carrasco is a 42 y.o. female who presents for Headache (X2-3 days) and Irregular Menses ( has been on her period for 10-11 days )      HPI: This is a 42-year-old female who presents today for vaginal bleeding.  This is a new problem.  Patient reports consistent vaginal bleeding x11 days.  Patient reports changing pads every 5 hours.  She reports that she did not have menstrual cycle last month, and followed up with PCP regarding this.  She reports PCP ordered pelvic ultrasound which she is scheduled to have next week.  She denies abdominal pain.  She does report associated headache, weakness, and fatigue.  She does have history of tubal ligation.    ROS per HPI    Medications:    Current Outpatient Medications on File Prior to Visit   Medication Sig Dispense Refill    vitamin D2, Ergocalciferol, (DRISDOL) 1.25 MG (40533 UT) Cap capsule Take  by mouth every 7 days.      phentermine 37.5 MG capsule Take 1 Capsule by mouth every morning for 90 days. 90 Capsule 0     No current facility-administered medications on file prior to visit.        Allergies:   Patient has no known allergies.    Problem List:   Patient Active Problem List   Diagnosis    History of C/S in [place] for Twin Gestation - Desires Repeat only    SUPERVISION OF HIGH-RISK PREGNANCY    Language barrier, cultural differences - Grenadian     delivery at 36wk (twin gestation)    History of GDM-A1    Preventative health care    Acute seasonal allergic rhinitis    Pure hypercholesterolemia    Viral upper respiratory tract infection    Bronchitis    BMI 35.0-35.9,adult    Abnormal menstrual periods        Surgical History:  Past Surgical History:   Procedure Laterality Date    REPEAT C SECTION  2010    Performed by LAILA CIFUENTES at LABOR AND DELIVERY    PRIMARY C SECTION         Past Social Hx:   Social History     Tobacco Use    Smoking status: Never    Smokeless tobacco: Never   Vaping Use    Vaping Use: Never used  "  Substance Use Topics    Alcohol use: No    Drug use: No          Problem list, medications, and allergies reviewed by myself today in Epic.     Objective:     /82   Pulse (!) 107   Temp 36.7 °C (98 °F) (Temporal)   Resp 18   Ht 1.702 m (5' 7\")   Wt 95.3 kg (210 lb)   LMP 12/04/2022 (Approximate)   SpO2 98%   Breastfeeding No   BMI 32.89 kg/m²     Physical Exam  Vitals and nursing note reviewed.   Constitutional:       General: She is not in acute distress.     Appearance: Normal appearance. She is normal weight. She is not ill-appearing, toxic-appearing or diaphoretic.   HENT:      Head: Normocephalic and atraumatic.   Cardiovascular:      Rate and Rhythm: Regular rhythm. Tachycardia present.      Pulses: Normal pulses.      Heart sounds: Normal heart sounds. No murmur heard.    No friction rub. No gallop.   Pulmonary:      Effort: Pulmonary effort is normal. No respiratory distress.      Breath sounds: Normal breath sounds. No stridor. No wheezing, rhonchi or rales.   Chest:      Chest wall: No tenderness.   Musculoskeletal:      Cervical back: Neck supple. No tenderness.   Lymphadenopathy:      Cervical: No cervical adenopathy.   Skin:     General: Skin is warm and dry.      Capillary Refill: Capillary refill takes less than 2 seconds.   Neurological:      General: No focal deficit present.      Mental Status: She is alert and oriented to person, place, and time. Mental status is at baseline.   Psychiatric:         Mood and Affect: Mood normal.         Behavior: Behavior normal.         Thought Content: Thought content normal.         Judgment: Judgment normal.       Assessment/Plan:     Diagnosis and associated orders:   1. Vaginal bleeding               Comments/MDM:   Acute problem.  Patient's blood pressure stable in clinic, she does not have slightly elevated heart rate.  Given her acute vaginal bleeding and symptoms, I do believe patient needs further evaluation with labs and pelvic " ultrasound.  Unfortunately, I am unable to obtain these in urgent care at this time of day.  Given this, I am advising patient to go to ED for further evaluation and management where she can obtain labs and imaging.  Patient is agreeable and verbalizes understanding of this               Please note that this dictation was created using voice recognition software. I have made a reasonable attempt to correct obvious errors, but I expect that there are errors of grammar and possibly content that I did not discover before finalizing the note.    This note was electronically signed by KIP Ortiz

## 2022-12-05 NOTE — DISCHARGE INSTRUCTIONS
Please call the gynecologist to whom you were referred on Monday for an appointment this week.  They should do an endometrial biopsy.  Take Tylenol for headache and ibuprofen to try to limit the bleeding.  Go to [facility] for severe bleeding, persistent dizziness, fainting, abdominal pain and fever or ill appearance.

## 2022-12-05 NOTE — ED PROVIDER NOTES
ED Provider Note    Scribed for Amaury Perry M.D. by Ramonita Sesay. 12/4/2022  7:34 PM    Primary care provider: Billy Heck M.D.  Means of arrival: walk in  History obtained from: patient  History limited by: none    CHIEF COMPLAINT  Chief Complaint   Patient presents with    Vaginal Bleeding     Reports menstrual cycle x approx 10 days. Felt SOB while climbing stairs today, and is experiencing a headache. Reports she changed 2 pads today, prior to today was changing this approx q4-5h. Denies known pregnancy.     Headache    Shortness of Breath       HPI  Keith Carrasco is a 42 y.o. female who presents to the Emergency Department for continued vaginal bleeding onset 10 days ago. Patient states her menstrual periods typically last 2-3 days but this one has persisted for 10 days with a heavier flow and passage of blood clots. She does report a history of irregular cycles and has followed up with her PCP who scheduled her for an ultrasound next week. She also reports associated symptoms the past few days of headache, breathlessness going up stairs, body aches, fatigue, and mild constipation but denies any fever, sore throat, cough, abdominal pain, vomit, diarrhea or dysuria. She denies a history of asthma, ovarian cysts or fibroids.    REVIEW OF SYSTEMS  Pertinent positives include: prolonged vaginal bleeding, headache, breathlessness going up stairs, body aches, fatigue, and mild constipation.  Pertinent negatives include: fever, sore throat, cough, abdominal pain, vomit, diarrhea or dysuria.  10+ systems reviewed and negative.      PAST MEDICAL HISTORY  Past Medical History:   Diagnosis Date    Allergy        SOCIAL HISTORY  Social History     Tobacco Use    Smoking status: Never    Smokeless tobacco: Never   Vaping Use    Vaping Use: Never used   Substance Use Topics    Alcohol use: No    Drug use: No     Social History     Substance and Sexual Activity   Drug Use No       SURGICAL HISTORY  Past Surgical  "History:   Procedure Laterality Date    REPEAT C SECTION  12/30/2010    Performed by LAILA CIFUENTES at [facility]    PRIMARY C SECTION  2007       CURRENT MEDICATIONS  Home Medications       Reviewed by Renuka Murillo R.N. (Registered Nurse) on 12/04/22 at 1805  Med List Status: Not Addressed     Medication Last Dose Status   phentermine 37.5 MG capsule  Active   vitamin D2, Ergocalciferol, (DRISDOL) 1.25 MG (76917 UT) Cap capsule  Active                    ALLERGIES  No Known Allergies    PHYSICAL EXAM  VITAL SIGNS: /79   Pulse 93   Temp 36.4 °C (97.6 °F) (Temporal)   Resp 18   Ht 1.702 m (5' 7\")   Wt 95 kg (209 lb 7 oz)   LMP 12/04/2022 (Approximate) Comment: tubal ligation  SpO2 97%   BMI 32.80 kg/m²   Reviewed and afebrile  Constitutional: Well developed, Well nourished.  Well-appearing  HENT: Normocephalic, atraumatic, bilateral external ears normal, wearing a mask.   Eyes: PERRLA, mildly pale conjunctiva, no scleral icterus.   Cardiovascular: Regular rate and rhythm. No murmurs, rubs or gallops.  No dependent edema or calf tenderness  Respiratory: Lungs clear to auscultation bilaterally. No wheezes, rales, or rhonchi.  Abdominal:  Abdomen soft, non-tender, non distended. No rebound, or guarding.    Skin: No erythema, no rash.   Genitourinary: No costovertebral angle tenderness.   Musculoskeletal: no deformities.   Neurologic: Alert & oriented x 3, cranial nerves 2-12 intact by passive exam.  No focal deficit noted.  Psychiatric: Affect normal, Judgment normal, Mood normal.     DIFFERENTIAL DIAGNOSIS:  Uterine fibroid, adenomyosis, pregnancy, anemia, dysfunctional uterine bleeding.    EKG Interpretation:  Interpreted by me    Rhythm:  Normal sinus rhythm   Rate: Normal at 90  Axis: normal  Ectopy: none  Conduction: normal  ST Segments: no acute change  T Waves: no acute change  Q Waves: none  Clinical Impression: Normal EKG without acute changes "     RADIOLOGY/PROCEDURES  DX-CHEST-PORTABLE (1 VIEW)   Final Result         1.  No acute cardiopulmonary disease.      US-PELVIC COMPLETE (TRANSABDOMINAL/TRANSVAGINAL) (COMBO)   Final Result         1.  Thickened endometrial stripe, could represent liver changes are considered endometrial pathology with additional workup as clinically appropriate.   2.  Right ovarian cyst without complex features.        Radiologist interpretation have been reviewed by me.     LABORATORY:  Results for orders placed or performed during the hospital encounter of 12/04/22   CBC WITH DIFFERENTIAL   Result Value Ref Range    WBC 8.9 4.8 - 10.8 K/uL    RBC 3.92 (L) 4.20 - 5.40 M/uL    Hemoglobin 11.8 (L) 12.0 - 16.0 g/dL    Hematocrit 36.2 (L) 37.0 - 47.0 %    MCV 92.3 81.4 - 97.8 fL    MCH 30.1 27.0 - 33.0 pg    MCHC 32.6 (L) 33.6 - 35.0 g/dL    RDW 44.8 35.9 - 50.0 fL    Platelet Count 410 164 - 446 K/uL    MPV 10.2 9.0 - 12.9 fL    Neutrophils-Polys 46.10 44.00 - 72.00 %    Lymphocytes 37.80 22.00 - 41.00 %    Monocytes 5.00 0.00 - 13.40 %    Eosinophils 10.20 (H) 0.00 - 6.90 %    Basophils 0.70 0.00 - 1.80 %    Immature Granulocytes 0.20 0.00 - 0.90 %    Nucleated RBC 0.00 /100 WBC    Neutrophils (Absolute) 4.09 2.00 - 7.15 K/uL    Lymphs (Absolute) 3.35 1.00 - 4.80 K/uL    Monos (Absolute) 0.44 0.00 - 0.85 K/uL    Eos (Absolute) 0.90 (H) 0.00 - 0.51 K/uL    Baso (Absolute) 0.06 0.00 - 0.12 K/uL    Immature Granulocytes (abs) 0.02 0.00 - 0.11 K/uL    NRBC (Absolute) 0.00 K/uL   COMP METABOLIC PANEL   Result Value Ref Range    Sodium 138 135 - 145 mmol/L    Potassium 3.9 3.6 - 5.5 mmol/L    Chloride 104 96 - 112 mmol/L    Co2 25 20 - 33 mmol/L    Anion Gap 9.0 7.0 - 16.0    Glucose 96 65 - 99 mg/dL    Bun 8 8 - 22 mg/dL    Creatinine 0.66 0.50 - 1.40 mg/dL    Calcium 9.3 8.4 - 10.2 mg/dL    AST(SGOT) 18 12 - 45 U/L    ALT(SGPT) 17 2 - 50 U/L    Alkaline Phosphatase 72 30 - 99 U/L    Total Bilirubin 0.2 0.1 - 1.5 mg/dL    Albumin 4.3  3.2 - 4.9 g/dL    Total Protein 7.3 6.0 - 8.2 g/dL    Globulin 3.0 1.9 - 3.5 g/dL    A-G Ratio 1.4 g/dL   URINALYSIS    Specimen: Urine, Clean Catch   Result Value Ref Range    Color Red (A)     Character Cloudy (A)     Specific Gravity 1.010 <1.035    Ph 7.0 5.0 - 8.0    Glucose 100 (A) Negative mg/dL    Ketones 15 (A) Negative mg/dL    Protein >=300 (A) Negative mg/dL    Bilirubin Negative Negative    Nitrite Positive (A) Negative    Leukocyte Esterase Moderate (A) Negative    Occult Blood Large (A) Negative    Micro Urine Req Microscopic    HCG QUAL SERUM   Result Value Ref Range    Beta-Hcg Qualitative Serum Negative Negative   ESTIMATED GFR   Result Value Ref Range    GFR (CKD-EPI) 113 >60 mL/min/1.73 m 2   URINE MICROSCOPIC (W/UA)   Result Value Ref Range    WBC 10-20 (A) /hpf    RBC >150 (A) /hpf    Bacteria Few (A) None /hpf    Epithelial Cells Rare Few /hpf   Hemoglobin is declined from 13.6 in October.  Urinalysis is likely contaminated with menstrual blood.  Lab results reviewed by me.     INTERVENTIONS:  Medications   acetaminophen (Tylenol) tablet 650 mg (has no administration in time range)     Response:improved.    ED COURSE:  Nursing notes, VS, PMSFHx reviewed in chart.     7:34 PM - Patient seen and examined at bedside. Ordered EKG, DX-chest, US-pelvic transvaginal, COD, HCG qual, UA, CMP, CBC-diff to evaluate.     9:15 PM - I reevaluated the patient at bedside. I discussed the patient's diagnostic study results which show no acute abnormalities. I discussed plan for discharge and follow up as outlined below. The patient is stable for discharge at this time and will return for any new or worsening symptoms. Patient verbalizes understanding and support with my plan for discharge. Patient will be treated with Tylenol 650 mg prior to discharge.    MEDICAL DECISION MAKING:  This patient presents with menorrhagia or dysfunctional uterine bleeding apparently related to endometrial hyperplasia.  There is  no evidence of adenomyosis or fibroids.  She has lost a small amount of blood but does not have a significant anemia.  She also has a moderate headache that is likely a tension headache.  There is no evidence of viral syndrome.  Urinalysis appears contaminated with menstrual blood.  She is not pregnant.  Patient needs follow-up with GYN to whom she is already referred for endometrial biopsy.    PLAN:    Acetaminophen for headache  NSAIDs for vaginal bleeding  Patient is stable for discharge as outlined below.  Abnormal uterine bleeding handout given  Return to [facility] for the bleeding, dizziness, fainting    Follow-up with a gynecologist to whom you were referred this week    CONDITION: good.     FINAL IMPRESSION  1. Dysfunctional uterine bleeding    2. Endometrial thickening on ultrasound    3. Tension headache          Ramonita HERNANDEZ (Scribe), am scribing for, and in the presence of, Amaury Perry M.D..    Electronically signed by: Ramonita Sesay (Scribe), 12/4/2022    IAmaury M.D. personally performed the services described in this documentation, as scribed by Ramonita Sesay in my presence, and it is both accurate and complete.    The note accurately reflects work and decisions made by me.  Amaury Perry M.D.  12/4/2022  10:22 PM

## 2022-12-20 ENCOUNTER — GYNECOLOGY VISIT (OUTPATIENT)
Dept: OBGYN | Facility: CLINIC | Age: 42
End: 2022-12-20
Payer: COMMERCIAL

## 2022-12-20 VITALS — BODY MASS INDEX: 33.36 KG/M2 | HEART RATE: 76 BPM | WEIGHT: 213 LBS

## 2022-12-20 DIAGNOSIS — N83.201 CYST OF RIGHT OVARY: ICD-10-CM

## 2022-12-20 DIAGNOSIS — N92.1 MENORRHAGIA WITH IRREGULAR CYCLE: ICD-10-CM

## 2022-12-20 PROCEDURE — 99204 OFFICE O/P NEW MOD 45 MIN: CPT | Performed by: OBSTETRICS & GYNECOLOGY

## 2022-12-20 RX ORDER — PROGESTERONE 200 MG/1
200 CAPSULE ORAL
Qty: 14 CAPSULE | Refills: 6 | Status: SHIPPED | OUTPATIENT
Start: 2022-12-20

## 2022-12-20 ASSESSMENT — FIBROSIS 4 INDEX: FIB4 SCORE: .4365641250653993522

## 2022-12-20 NOTE — PROGRESS NOTES
Keith Carrasco is a 42 y.o.  female who presents for heavy and irregular bleeding as she was seen in the ER on .      HPI: Patient had normal periods up until September then she skipped her period in October and started bleeding  that lasted till .  The bleeding has stopped now.  She went to the ER on  and had an ultrasound that showed a thickened endometrium as well as a right ovarian cyst.  She also was mildly anemic.  Discussed hormonal and cyclic changes that can happen during times of stress or in a woman's 40s.  Recommended checking her thyroid prediabetic number and monitoring her anemia.  Also discussed low-dose birth control pills versus cyclic progesterone.  She will start the progesterone on  through  of each month, sooner if she starts heavy bleeding before then.  We will get an ultrasound to follow-up on the ovarian cyst and the lining of the uterus after she has had a couple of withdrawal bleeds.  Patient's questions were answered and she will stop has stopped taking the phentermine    Review of Systems:   Pertinent positives documented in HPI and all other systems reviewed & are negative.     All PMH, PSH, allergies, social history and FH reviewed and updated today:  Past Medical History:   Diagnosis Date    Allergy        Past Surgical History:   Procedure Laterality Date    REPEAT C SECTION  2010    Performed by LAILA CIFUENTES at [facility] DELIVERY    PRIMARY C SECTION           Current Outpatient Medications:     progesterone, 200 mg, Oral, QHS    vitamin D2 (Ergocalciferol), Take  by mouth every 7 days. (Patient not taking: Reported on 2022), Not Taking    phentermine, 37.5 mg, Oral, QAM (Patient not taking: Reported on 2022), Not Taking    Patient has no known allergies.    Social History     Socioeconomic History    Marital status:    Tobacco Use    Smoking status: Never    Smokeless tobacco: Never   Vaping  Use    Vaping Use: Never used   Substance and Sexual Activity    Alcohol use: No    Drug use: No    Sexual activity: Yes     Partners: Male       History reviewed. No pertinent family history.       Objective:   Vital measurements:  Wt 213 lb   Body mass index is 33.36 kg/m². (Goal BM I>18 <25)    Physical Exam:Physical Exam  Constitutional:       Appearance: Normal appearance.   Eyes:      Extraocular Movements: Extraocular movements intact.      Pupils: Pupils are equal, round, and reactive to light.   Pulmonary:      Effort: Pulmonary effort is normal.   Abdominal:      General: Abdomen is flat.      Palpations: Abdomen is soft.   Genitourinary:     General: Normal vulva.      Comments: Vaginal mucosa normal scant dark blood, cervix nulliparous with scant blood at the os, uterus nontender and mobile, adnexa difficult to palpate due to habitus but nontender  Neurological:      Mental Status: She is alert.        Assessment:     1. Menorrhagia with irregular cycle  - TSH+FREE T4  - US-PELVIC TRANSVAGINAL ONLY; Future  - CBC WITHOUT DIFFERENTIAL; Future  - HEMOGLOBIN A1C; Future  - US-PELVIC TRANSVAGINAL ONLY; Future    2. Cyst of right ovary  - US-PELVIC TRANSVAGINAL ONLY; Future    Other orders  - progesterone (PROMETRIUM) 200 MG capsule; Take 1 Capsule by mouth at bedtime.  Dispense: 14 Capsule; Refill: 6        Plan:   Pelvic ultrasound in February to follow-up on lining and ovarian cyst  Cyclic progesterone to help with withdrawal bleed and complaints of weight gain etc.  She will start this on the first to the 14th of each month  Labs for thyroid prediabetic and anemia  Follow-up with primary care doctor as well  Follow-up after ultrasound, for annual or call in with problems or complaints.  Precautions given

## 2022-12-20 NOTE — PROGRESS NOTES
NP here today for GYN appt.  Phone # [phone] (home)   C/o had bleeding from 11/20-12/18. No longer bleeding but c/o of irregular bleeding

## 2022-12-21 ENCOUNTER — HOSPITAL ENCOUNTER (OUTPATIENT)
Dept: LAB | Facility: MEDICAL CENTER | Age: 42
End: 2022-12-21
Attending: OBSTETRICS & GYNECOLOGY
Payer: COMMERCIAL

## 2022-12-21 DIAGNOSIS — N92.1 MENORRHAGIA WITH IRREGULAR CYCLE: ICD-10-CM

## 2022-12-21 LAB
ERYTHROCYTE [DISTWIDTH] IN BLOOD BY AUTOMATED COUNT: 45.6 FL (ref 35.9–50)
EST. AVERAGE GLUCOSE BLD GHB EST-MCNC: 120 MG/DL
HBA1C MFR BLD: 5.8 % (ref 4–5.6)
HCT VFR BLD AUTO: 27.5 % (ref 37–47)
HGB BLD-MCNC: 8.5 G/DL (ref 12–16)
MCH RBC QN AUTO: 28.8 PG (ref 27–33)
MCHC RBC AUTO-ENTMCNC: 30.9 G/DL (ref 33.6–35)
MCV RBC AUTO: 93.2 FL (ref 81.4–97.8)
PLATELET # BLD AUTO: 512 K/UL (ref 164–446)
PMV BLD AUTO: 10.4 FL (ref 9–12.9)
RBC # BLD AUTO: 2.95 M/UL (ref 4.2–5.4)
T4 FREE SERPL-MCNC: 0.89 NG/DL (ref 0.93–1.7)
TSH SERPL DL<=0.005 MIU/L-ACNC: 2.81 UIU/ML (ref 0.38–5.33)
WBC # BLD AUTO: 6.3 K/UL (ref 4.8–10.8)

## 2022-12-21 PROCEDURE — 83036 HEMOGLOBIN GLYCOSYLATED A1C: CPT

## 2022-12-21 PROCEDURE — 36415 COLL VENOUS BLD VENIPUNCTURE: CPT

## 2022-12-21 PROCEDURE — 84443 ASSAY THYROID STIM HORMONE: CPT

## 2022-12-21 PROCEDURE — 85027 COMPLETE CBC AUTOMATED: CPT

## 2022-12-21 PROCEDURE — 84439 ASSAY OF FREE THYROXINE: CPT

## 2022-12-21 RX ORDER — FERROUS FUMARATE 324(106)MG
1 TABLET ORAL 2 TIMES DAILY
Qty: 60 TABLET | Refills: 6 | Status: SHIPPED | OUTPATIENT
Start: 2022-12-21

## 2023-02-14 ENCOUNTER — HOSPITAL ENCOUNTER (OUTPATIENT)
Dept: RADIOLOGY | Facility: MEDICAL CENTER | Age: 43
End: 2023-02-14
Attending: OBSTETRICS & GYNECOLOGY
Payer: COMMERCIAL

## 2023-02-14 DIAGNOSIS — N92.1 MENORRHAGIA WITH IRREGULAR CYCLE: ICD-10-CM

## 2023-02-14 DIAGNOSIS — N83.201 CYST OF RIGHT OVARY: ICD-10-CM

## 2023-02-14 PROCEDURE — 76830 TRANSVAGINAL US NON-OB: CPT

## 2023-02-22 ENCOUNTER — OFFICE VISIT (OUTPATIENT)
Dept: MEDICAL GROUP | Age: 43
End: 2023-02-22
Payer: COMMERCIAL

## 2023-02-22 VITALS
BODY MASS INDEX: 33.33 KG/M2 | WEIGHT: 212.8 LBS | HEART RATE: 93 BPM | TEMPERATURE: 97.3 F | OXYGEN SATURATION: 97 % | DIASTOLIC BLOOD PRESSURE: 64 MMHG | SYSTOLIC BLOOD PRESSURE: 98 MMHG

## 2023-02-22 DIAGNOSIS — N92.1 MENORRHAGIA WITH IRREGULAR CYCLE: ICD-10-CM

## 2023-02-22 DIAGNOSIS — R06.09 DYSPNEA ON EXERTION: ICD-10-CM

## 2023-02-22 DIAGNOSIS — N92.6 ABNORMAL MENSTRUAL PERIODS: ICD-10-CM

## 2023-02-22 PROCEDURE — 99214 OFFICE O/P EST MOD 30 MIN: CPT | Performed by: FAMILY MEDICINE

## 2023-02-22 ASSESSMENT — FIBROSIS 4 INDEX: FIB4 SCORE: 0.35

## 2023-02-22 NOTE — PROGRESS NOTES
This medical record contains text that has been entered with the assistance of computer voice recognition and dictation software.  Therefore, it may contain unintended errors in text, spelling, punctuation, or grammar      Chief Complaint   Patient presents with    Medication Refill     Patient presents for medication follow up.         Keith Carrasco is a 42 y.o. female here evaluation and management of: Routine follow-up      HPI:       1. Menorrhagia with irregular cycle      2. Abnormal menstrual periods  The patient is status post tubal ligation and had been having some abnormal heavy menstrual bleeding.  She was seen in the emergency room where an ultrasound of the pelvis revealed thickened endometrium as well as a right ovarian cyst.  She was also anemic.  She was started on progesterone from January 1 through 14 continue each month.  She will have a follow-up ultrasound of the ovarian cyst and the lining of the uterus after she has had a couple of withdrawal bleeds per gynecology.  She has stopped taking the phentermine.  She was placed on iron supplementation.     Latest Reference Range & Units 12/21/22 08:07   Hemoglobin 12.0 - 16.0 g/dL 8.5 (L)   Hematocrit 37.0 - 47.0 % 27.5 (L)   MCV 81.4 - 97.8 fL 93.2   MCH 27.0 - 33.0 pg 28.8   (L): Data is abnormally low       Latest Reference Range & Units 12/21/22 08:07   Glycohemoglobin 4.0 - 5.6 % 5.8 (H)   Estim. Avg Glu mg/dL 120   (H): Data is abnormally high   Latest Reference Range & Units 12/21/22 08:07   TSH 0.380 - 5.330 uIU/mL 2.810   Free T-4 0.93 - 1.70 ng/dL 0.89 (L)   (L): Data is abnormally low    3. Dyspnea on exertion  NEW UNDIAGNOSED PROBLEM    The patient states that now she is developing some shortness of breath that she walks up the stairs this is new.  She states she can exercise usually at home without any issues so she was worried about this.      Current medicines (including changes today)  Current Outpatient Medications   Medication Sig  Dispense Refill    Ferrous Fumarate 324 (106 Fe) MG Tab Take 1 Tablet by mouth 2 times a day. 60 Tablet 6    progesterone (PROMETRIUM) 200 MG capsule Take 1 Capsule by mouth at bedtime. 14 Capsule 6    vitamin D2, Ergocalciferol, (DRISDOL) 1.25 MG (22396 UT) Cap capsule Take  by mouth every 7 days.       No current facility-administered medications for this visit.     She  has a past medical history of Allergy.  She  has a past surgical history that includes primary c section (2007) and repeat c section (12/30/2010).  Social History     Tobacco Use    Smoking status: Never    Smokeless tobacco: Never   Vaping Use    Vaping Use: Never used   Substance Use Topics    Alcohol use: No    Drug use: No     Social History     Social History Narrative    Not on file     History reviewed. No pertinent family history.  Family Status   Relation Name Status    Mo  Alive    Fa  Alive         ROS    The pertinent  ROS findings can be seen in the HPI above.     All other systems reviewed and are negative     Objective:     BP 98/64 (BP Location: Right arm, Patient Position: Sitting, BP Cuff Size: Adult)   Pulse 93   Temp 36.3 °C (97.3 °F) (Temporal)   Wt 96.5 kg (212 lb 12.8 oz)   SpO2 97%  Body mass index is 33.33 kg/m².      Physical Exam:    Constitutional: Alert, no distress.  Skin: No suspicious lesions  Eye: Equal, round and reactive, conjunctiva clear, lids normal.  ENMT: Lips without lesions, good dentition, oropharynx clear.  Neck: Trachea midline, no masses, no thyromegaly. No cervical or supraclavicular lymphadenopathy.  Respiratory: Unlabored respiratory effort, lungs clear to auscultation, no wheezes, no ronchi.  Cardiovascular: Normal S1, S2, no murmur, no edema  Abdomen: Soft, non-tender, no masses, no hepatosplenomegaly.        Assessment and Plan:   The following treatment plan was discussed    All recent labs and provider notes reviewed    1. Menorrhagia with irregular cycle    2. Abnormal menstrual  periods    She will return back to gynecology after she has had withdrawal bleedings x2.      - Referral to Gynecology    3. Dyspnea on exertion    The anemia could explain this.  However we will establish care in cardiology for appropriate evaluation    - REFERRAL TO CARDIOLOGY             Instructed to Follow up in clinic or ER for worsening symptoms, difficulty breathing, lack of expected recovery, or should new symptoms or problems arise.    Followup: Return in about 3 months (around 5/22/2023) for Reevaluation, labs.

## 2023-02-27 ENCOUNTER — GYNECOLOGY VISIT (OUTPATIENT)
Dept: OBGYN | Facility: CLINIC | Age: 43
End: 2023-02-27
Payer: COMMERCIAL

## 2023-02-27 VITALS — WEIGHT: 215 LBS | SYSTOLIC BLOOD PRESSURE: 100 MMHG | DIASTOLIC BLOOD PRESSURE: 69 MMHG | BODY MASS INDEX: 33.67 KG/M2

## 2023-02-27 DIAGNOSIS — N93.9 ABNORMAL UTERINE BLEEDING (AUB): Primary | ICD-10-CM

## 2023-02-27 DIAGNOSIS — N92.1 MENORRHAGIA WITH IRREGULAR CYCLE: ICD-10-CM

## 2023-02-27 LAB
POCT INT CON NEG: NEGATIVE
POCT INT CON POS: POSITIVE
POCT URINE PREGNANCY TEST: NEGATIVE

## 2023-02-27 PROCEDURE — 81025 URINE PREGNANCY TEST: CPT | Performed by: OBSTETRICS & GYNECOLOGY

## 2023-02-27 PROCEDURE — 99213 OFFICE O/P EST LOW 20 MIN: CPT | Performed by: OBSTETRICS & GYNECOLOGY

## 2023-02-27 ASSESSMENT — FIBROSIS 4 INDEX: FIB4 SCORE: 0.35

## 2023-02-27 NOTE — PROGRESS NOTES
Patient here for a U/S follow up.   Last seen on 12/21/22  c/o Ultrasound results   pharmacy verified.  Patient phone #:[phone] (home)

## 2023-02-27 NOTE — PROGRESS NOTES
Problem visit for bleeding.  CC:  Gynecologic Exam (Abnormal bleeding)       HPI: Patient is a 42 y.o. year old  who presents for AUB follow-up.  She missed her cycle in October, but then she had an entire month of bleeding in November.  She was seen by Dr. Conn.  She was started on cyclic progesterone 200 mg of Prometrium on days 1 through 14 of her cycle.  Her cycles have been regular since she started on this medication.  She bleeds for about 1 week/month.  She does think that the medication might be impacting her weight and making her more hungry.  Since she only had 1 episode of heavy bleeding, she would like to discontinue the medication and see if it happens again.  She understands that she may not go through menopause until age 52.  She recently had an ultrasound on 2023 and it showed a possible polyp in the cervical canal.  Both ovaries appear normal.  She has a bilateral tubal ligation for contraception.       LMP Menstrual History: 2022     Gynecologic:  Clots or heavy flow: no, cycles have been better on the Prometrium  Miss regular activity on periods none    Significant pain with periods none    Unusual discharge none   Significant pelvic pain none    Social History     Substance and Sexual Activity   Sexual Activity Yes    Partners: Male    Birth control/protection: Surgical       Hx Dysplasia : none   Hx STD : none      ALLERGIES / REACTIONS:  No Known Allergies                      SOCIAL HISTORY:   reports that she has never smoked. She has never used smokeless tobacco. She reports that she does not drink alcohol and does not use drugs.  Social History     Socioeconomic History    Marital status:      Spouse name: Not on file    Number of children: Not on file    Years of education: Not on file    Highest education level: Not on file   Occupational History    Not on file   Tobacco Use    Smoking status: Never    Smokeless tobacco: Never   Vaping Use    Vaping Use: Never  used   Substance and Sexual Activity    Alcohol use: No    Drug use: No    Sexual activity: Yes     Partners: Male     Birth control/protection: Surgical   Other Topics Concern    Not on file   Social History Narrative    Not on file     Social Determinants of Health     Financial Resource Strain: Not on file   Food Insecurity: Not on file   Transportation Needs: Not on file   Physical Activity: Not on file   Stress: Not on file   Social Connections: Not on file   Intimate Partner Violence: Not on file   Housing Stability: Not on file         OBSTETRIC HISTORY:  OB History    Para Term  AB Living   2 2 1 1   3   SAB IAB Ectopic Molar Multiple Live Births           1 3      # Outcome Date GA Lbr Anatoliy/2nd Weight Sex Delivery Anes PTL Lv   2 Term 07    M CS-Unspec   JOSE   1A  07 36w0d  5 lb 5 oz M CS-LTranv EPI  JOSE      Birth Comments: IVF    1B  07 36w0d  5 lb 9 oz M CS-LTranv EPI  JOSE       MEDICAL HISTORY:  Past Medical History:   Diagnosis Date    Allergy        MEDICATIONS:  Current Outpatient Medications on File Prior to Visit   Medication Sig Dispense Refill    progesterone (PROMETRIUM) 200 MG capsule Take 1 Capsule by mouth at bedtime. 14 Capsule 6    vitamin D2, Ergocalciferol, (DRISDOL) 1.25 MG (63581 UT) Cap capsule Take  by mouth every 7 days.      Ferrous Fumarate 324 (106 Fe) MG Tab Take 1 Tablet by mouth 2 times a day. (Patient not taking: Reported on 2023) 60 Tablet 6     No current facility-administered medications on file prior to visit.           FAMILY HISTORY:  Family History   Problem Relation Age of Onset    No Known Problems Mother     No Known Problems Father        SURGICAL HISTORY:  Past Surgical History:   Procedure Laterality Date    REPEAT C SECTION  2010    Performed by LAILA CIFUENTES at [facility]    PRIMARY C SECTION  2023 11:30 AM     HISTORY/REASON FOR EXAM:  Pain; f/u ovarian cyst  Menorrhagia.      TECHNIQUE/EXAM DESCRIPTION:  Transabdominal and transvaginal pelvic ultrasound.     COMPARISON:   2022     FINDINGS:  Both transabdominal and transvaginal scanning were performed to optimally visualize the pelvis.     UTERUS:  The uterus measures 4.73 cm x 10.01 cm x 5.33 cm.  The uterine myometrium is within normal limits.  The endometrial echo complex measures 0.98 cm.  Suggestion of polypoid structure within the cervical canal.  The endometrium is unremarkable in appearance and thickness for age and menstrual status.     OVARIES:  The right ovary measures 3.4 x 1.9 x 1.8 cm. Duplex Doppler examination of the right ovary shows normal waveforms. Follicles present.     The left ovary measures 3.8 x 2.6 x 2.0 cm. Duplex Doppler examination of the left ovary shows normal waveforms. Follicle present.  Ovoid structure with internal echoes and peripheral hyperemia measuring 1.8 cm.     There is no free fluid seen.     IMPRESSION:     1.  Probable hemorrhagic cyst or corpus luteum LEFT ovary measuring 1.8 cm.  Recommend follow-up to resolution.  2.  Interval resolution of RIGHT ovarian cyst.  3.  Potential polypoid structure within the cervical canal, not well delineated.      PHYSICAL EXAMINATION:  Vital Signs:   Vitals:    23 0827   BP: 100/69   BP Location: Right arm   Patient Position: Sitting   BP Cuff Size: Large adult   Weight: 215 lb     Appearance/Psychiatric: She does not appear anxious.  Constitutional: The patient is well nourished.  Neck: Neck appears symmetric.  Respiratory: normal  Pelvic: Deferred, recently had normal pelvic exam with Dr. Conn Extremeties: Legs are symmetric and without tenderness.  Skin: No rash observed    ASSESSMENT AND PLAN:  42 y.o.  complaining of abnormal uterine bleeding.     Diagnoses and all orders for this visit:    Abnormal uterine bleeding (AUB)  - Recently had a normal pelvic ultrasound except for possible polypoid structure within the cervical canal  -  Pregnancy test negative today, status post bilateral salpingectomy  - I do not see a recent Pap smear, plan for Pap smear at next visit  - TSH normal December 2022  - Since patient only experienced 1 episode of heavy bleeding, she would like to stop the Prometrium and see how her cycles do without the Prometrium.  She does feel that the Prometrium makes her hungrier and makes it more difficult for her to lose weight.  - Plan for follow-up in 3 months.  If she experiences another episode of heavy bleeding, recommend endometrial biopsy or possible hysteroscopy D&C.  Also discussed that she is a good candidate for a Mirena IUS.  I discussed that cycles can be lighter or non- existent with the Mirena IUS and that it would prevent her from having to take a pill.    Follow-up in 3 months to discuss bleeding pattern, obtain pap, and treatment options if needed.    Camelia Booker M.D.  2/27/2023

## 2023-03-02 ENCOUNTER — TELEPHONE (OUTPATIENT)
Dept: HEALTH INFORMATION MANAGEMENT | Facility: OTHER | Age: 43
End: 2023-03-02
Payer: COMMERCIAL

## 2023-04-14 ENCOUNTER — TELEPHONE (OUTPATIENT)
Dept: CARDIOLOGY | Facility: MEDICAL CENTER | Age: 43
End: 2023-04-14

## 2023-04-14 NOTE — TELEPHONE ENCOUNTER
LVM for patient in regards to obtaining records for NP appointment with DA. Unable to confirm if  patient has never been treated by a previous cardiologist.

## 2023-04-19 ENCOUNTER — TELEPHONE (OUTPATIENT)
Dept: HEALTH INFORMATION MANAGEMENT | Facility: OTHER | Age: 43
End: 2023-04-19

## 2023-04-20 ENCOUNTER — APPOINTMENT (OUTPATIENT)
Dept: CARDIOLOGY | Facility: MEDICAL CENTER | Age: 43
End: 2023-04-20
Attending: FAMILY MEDICINE

## 2024-05-15 ENCOUNTER — OFFICE VISIT (OUTPATIENT)
Dept: MEDICAL GROUP | Age: 44
End: 2024-05-15
Payer: COMMERCIAL

## 2024-05-15 VITALS
WEIGHT: 194 LBS | SYSTOLIC BLOOD PRESSURE: 114 MMHG | OXYGEN SATURATION: 99 % | HEART RATE: 88 BPM | TEMPERATURE: 98.6 F | HEIGHT: 66 IN | DIASTOLIC BLOOD PRESSURE: 70 MMHG | BODY MASS INDEX: 31.18 KG/M2

## 2024-05-15 DIAGNOSIS — L23.5 ALLERGIC DERMATITIS DUE TO OTHER CHEMICAL PRODUCT: ICD-10-CM

## 2024-05-15 DIAGNOSIS — I83.12 VARICOSE VEINS OF BOTH LOWER EXTREMITIES WITH INFLAMMATION: ICD-10-CM

## 2024-05-15 DIAGNOSIS — I83.11 VARICOSE VEINS OF BOTH LOWER EXTREMITIES WITH INFLAMMATION: ICD-10-CM

## 2024-05-15 PROBLEM — I83.93 VARICOSE VEINS OF BOTH LOWER EXTREMITIES: Status: ACTIVE | Noted: 2024-05-15

## 2024-05-15 PROBLEM — L23.9 ALLERGIC CONTACT DERMATITIS: Status: ACTIVE | Noted: 2024-05-15

## 2024-05-15 PROCEDURE — 3078F DIAST BP <80 MM HG: CPT | Performed by: FAMILY MEDICINE

## 2024-05-15 PROCEDURE — 3074F SYST BP LT 130 MM HG: CPT | Performed by: FAMILY MEDICINE

## 2024-05-15 PROCEDURE — 99214 OFFICE O/P EST MOD 30 MIN: CPT | Performed by: FAMILY MEDICINE

## 2024-05-15 RX ORDER — PREDNISONE 20 MG/1
TABLET ORAL
Qty: 12 TABLET | Refills: 0 | Status: SHIPPED | OUTPATIENT
Start: 2024-05-15

## 2024-05-15 RX ORDER — TRIAMCINOLONE ACETONIDE 1 MG/G
CREAM TOPICAL
Qty: 50 G | Refills: 0 | Status: SHIPPED | OUTPATIENT
Start: 2024-05-15

## 2024-05-15 ASSESSMENT — FIBROSIS 4 INDEX: FIB4 SCORE: 0.36

## 2024-05-15 ASSESSMENT — PATIENT HEALTH QUESTIONNAIRE - PHQ9: CLINICAL INTERPRETATION OF PHQ2 SCORE: 0

## 2024-05-15 NOTE — PROGRESS NOTES
This medical record contains text that has been entered with the assistance of computer voice recognition and dictation software.  Therefore, it may contain unintended errors in text, spelling, punctuation, or grammar      Chief Complaint   Patient presents with    Rash     Patient noticed on arms and butt for about 1 week         Keith Carrasco is a 43 y.o. female here evaluation and management of: Rash, and pains      HPI:           1. Allergic dermatitis due to other chemical product  NEW UNDIAGNOSED PROBLEM    Patient states for the past week Or so she has developed rash on her left shoulder right buttock and right lower extremity.  She is not sure what it could have been caused by such as a new detergent new lotion or soap.  She is changed all these products thinking that was the cause.  The rash is very itchy.    2. Varicose veins of both lower extremities with inflammation  .  NEW UNDIAGNOSED PROBLEM    Patient also states that she has been having these painful veins on bilateral lower extremities which bulges and then she tries to stretch them out and then he collapsed and leaving a bruise.  She has tried compression stockings but they are so uncomfortable she cannot bear it.  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    Current medicines (including changes today)  Current Outpatient Medications   Medication Sig Dispense Refill    triamcinolone acetonide (KENALOG) 0.1 % Cream AAA BID UP TO  14 DAYS THEN STOP 50 g 0    predniSONE (DELTASONE) 20 MG Tab Take 3 tabs po for 2 days then 2 tabs for 2 days then 1 for 2 days 12 Tablet 0     No current facility-administered medications for this visit.     She  has a past medical history of Allergy.  She  has a past surgical history that includes primary c section (2007) and repeat c section (12/30/2010).  Social History     Tobacco Use    Smoking status: Never    Smokeless tobacco: Never   Vaping Use    Vaping status: Never Used  "  Substance Use Topics    Alcohol use: No    Drug use: No     Social History     Social History Narrative    Not on file     Family History   Problem Relation Age of Onset    No Known Problems Mother     No Known Problems Father      Family Status   Relation Name Status    Mo  Alive    Fa  Alive         ROS    The pertinent  ROS findings can be seen in the HPI above.     All other systems reviewed and are negative     Objective:     /70 (BP Location: Right arm, Patient Position: Sitting, BP Cuff Size: Large adult)   Pulse 88   Temp 37 °C (98.6 °F) (Temporal)   Ht 1.676 m (5' 6\")   Wt 88 kg (194 lb)   SpO2 99%  Body mass index is 31.31 kg/m².      Physical Exam:    Constitutional: Alert, no distress.  Skin: No suspicious lesions  Eye: Equal, round and reactive, conjunctiva clear, lids normal.  ENMT: Lips without lesions, good dentition, oropharynx clear.  Neck: Trachea midline, no masses, no thyromegaly. No cervical or supraclavicular lymphadenopathy.  Respiratory: Unlabored respiratory effort, lungs clear to auscultation, no wheezes, no ronchi.  Cardiovascular: Normal S1, S2, no murmur, no edema  Abdomen: Soft, non-tender, no masses, no hepatosplenomegaly.        Assessment and Plan:   The following treatment plan was discussed    All recent labs and provider notes reviewed    1. Allergic dermatitis due to other chemical product    We will use a topical medium potency steroid as well as a steroid taper    - triamcinolone acetonide (KENALOG) 0.1 % Cream; AAA BID UP TO  14 DAYS THEN STOP  Dispense: 50 g; Refill: 0  - predniSONE (DELTASONE) 20 MG Tab; Take 3 tabs po for 2 days then 2 tabs for 2 days then 1 for 2 days  Dispense: 12 Tablet; Refill: 0    2. Varicose veins of both lower extremities with inflammation  - Referral to Interventional Radiology             Instructed to Follow up in clinic or ER for worsening symptoms, difficulty breathing, lack of expected recovery, or should new symptoms or problems " arise.    Followup: No follow-ups on file.

## 2024-07-31 ENCOUNTER — APPOINTMENT (OUTPATIENT)
Dept: MEDICAL GROUP | Age: 44
End: 2024-07-31
Payer: COMMERCIAL

## 2024-07-31 VITALS
HEART RATE: 91 BPM | HEIGHT: 66 IN | WEIGHT: 192 LBS | OXYGEN SATURATION: 99 % | DIASTOLIC BLOOD PRESSURE: 70 MMHG | TEMPERATURE: 97.6 F | SYSTOLIC BLOOD PRESSURE: 120 MMHG | RESPIRATION RATE: 18 BRPM | BODY MASS INDEX: 30.86 KG/M2

## 2024-07-31 DIAGNOSIS — Z00.00 ANNUAL PHYSICAL EXAM: ICD-10-CM

## 2024-07-31 DIAGNOSIS — L23.5 ALLERGIC DERMATITIS DUE TO OTHER CHEMICAL PRODUCT: ICD-10-CM

## 2024-07-31 DIAGNOSIS — E78.00 PURE HYPERCHOLESTEROLEMIA: ICD-10-CM

## 2024-07-31 RX ORDER — TRIAMCINOLONE ACETONIDE 1 MG/G
CREAM TOPICAL
Qty: 50 G | Refills: 0 | Status: SHIPPED | OUTPATIENT
Start: 2024-07-31

## 2024-07-31 RX ORDER — PHENTERMINE HYDROCHLORIDE 37.5 MG/1
37.5 TABLET ORAL
Qty: 90 TABLET | Refills: 0 | Status: SHIPPED | OUTPATIENT
Start: 2024-07-31 | End: 2024-10-29

## 2024-07-31 ASSESSMENT — FIBROSIS 4 INDEX: FIB4 SCORE: 0.36

## 2024-08-07 ENCOUNTER — HOSPITAL ENCOUNTER (OUTPATIENT)
Dept: LAB | Facility: MEDICAL CENTER | Age: 44
End: 2024-08-07
Attending: FAMILY MEDICINE
Payer: COMMERCIAL

## 2024-08-07 DIAGNOSIS — E78.00 PURE HYPERCHOLESTEROLEMIA: ICD-10-CM

## 2024-08-07 DIAGNOSIS — Z00.00 ANNUAL PHYSICAL EXAM: ICD-10-CM

## 2024-08-07 LAB
25(OH)D3 SERPL-MCNC: 23 NG/ML (ref 30–100)
ALBUMIN SERPL BCP-MCNC: 3.9 G/DL (ref 3.2–4.9)
ALBUMIN/GLOB SERPL: 1.3 G/DL
ALP SERPL-CCNC: 73 U/L (ref 30–99)
ALT SERPL-CCNC: 18 U/L (ref 2–50)
ANION GAP SERPL CALC-SCNC: 11 MMOL/L (ref 7–16)
AST SERPL-CCNC: 21 U/L (ref 12–45)
BASOPHILS # BLD AUTO: 0.7 % (ref 0–1.8)
BASOPHILS # BLD: 0.04 K/UL (ref 0–0.12)
BILIRUB SERPL-MCNC: 0.3 MG/DL (ref 0.1–1.5)
BUN SERPL-MCNC: 10 MG/DL (ref 8–22)
CALCIUM ALBUM COR SERPL-MCNC: 9.2 MG/DL (ref 8.5–10.5)
CALCIUM SERPL-MCNC: 9.1 MG/DL (ref 8.5–10.5)
CHLORIDE SERPL-SCNC: 107 MMOL/L (ref 96–112)
CHOLEST SERPL-MCNC: 136 MG/DL (ref 100–199)
CO2 SERPL-SCNC: 19 MMOL/L (ref 20–33)
CREAT SERPL-MCNC: 0.7 MG/DL (ref 0.5–1.4)
EOSINOPHIL # BLD AUTO: 0.62 K/UL (ref 0–0.51)
EOSINOPHIL NFR BLD: 10.4 % (ref 0–6.9)
ERYTHROCYTE [DISTWIDTH] IN BLOOD BY AUTOMATED COUNT: 47.5 FL (ref 35.9–50)
GFR SERPLBLD CREATININE-BSD FMLA CKD-EPI: 110 ML/MIN/1.73 M 2
GLOBULIN SER CALC-MCNC: 2.9 G/DL (ref 1.9–3.5)
GLUCOSE SERPL-MCNC: 97 MG/DL (ref 65–99)
HCT VFR BLD AUTO: 40.8 % (ref 37–47)
HCV AB SER QL: NORMAL
HDLC SERPL-MCNC: 46 MG/DL
HGB BLD-MCNC: 13.1 G/DL (ref 12–16)
IMM GRANULOCYTES # BLD AUTO: 0 K/UL (ref 0–0.11)
IMM GRANULOCYTES NFR BLD AUTO: 0 % (ref 0–0.9)
LDLC SERPL CALC-MCNC: 81 MG/DL
LYMPHOCYTES # BLD AUTO: 2.1 K/UL (ref 1–4.8)
LYMPHOCYTES NFR BLD: 35.4 % (ref 22–41)
MCH RBC QN AUTO: 29.6 PG (ref 27–33)
MCHC RBC AUTO-ENTMCNC: 32.1 G/DL (ref 32.2–35.5)
MCV RBC AUTO: 92.1 FL (ref 81.4–97.8)
MONOCYTES # BLD AUTO: 0.45 K/UL (ref 0–0.85)
MONOCYTES NFR BLD AUTO: 7.6 % (ref 0–13.4)
NEUTROPHILS # BLD AUTO: 2.73 K/UL (ref 1.82–7.42)
NEUTROPHILS NFR BLD: 45.9 % (ref 44–72)
NRBC # BLD AUTO: 0 K/UL
NRBC BLD-RTO: 0 /100 WBC (ref 0–0.2)
PLATELET # BLD AUTO: 393 K/UL (ref 164–446)
PMV BLD AUTO: 12 FL (ref 9–12.9)
POTASSIUM SERPL-SCNC: 4.1 MMOL/L (ref 3.6–5.5)
PROT SERPL-MCNC: 6.8 G/DL (ref 6–8.2)
RBC # BLD AUTO: 4.43 M/UL (ref 4.2–5.4)
SODIUM SERPL-SCNC: 137 MMOL/L (ref 135–145)
T3FREE SERPL-MCNC: 3.3 PG/ML (ref 2–4.4)
T4 FREE SERPL-MCNC: 1.02 NG/DL (ref 0.93–1.7)
TRIGL SERPL-MCNC: 47 MG/DL (ref 0–149)
TSH SERPL-ACNC: 2.34 UIU/ML (ref 0.35–5.5)
WBC # BLD AUTO: 5.9 K/UL (ref 4.8–10.8)

## 2024-08-07 PROCEDURE — 84481 FREE ASSAY (FT-3): CPT

## 2024-08-07 PROCEDURE — 86803 HEPATITIS C AB TEST: CPT

## 2024-08-07 PROCEDURE — 80053 COMPREHEN METABOLIC PANEL: CPT

## 2024-08-07 PROCEDURE — 84439 ASSAY OF FREE THYROXINE: CPT

## 2024-08-07 PROCEDURE — 84443 ASSAY THYROID STIM HORMONE: CPT

## 2024-08-07 PROCEDURE — 85025 COMPLETE CBC W/AUTO DIFF WBC: CPT

## 2024-08-07 PROCEDURE — 36415 COLL VENOUS BLD VENIPUNCTURE: CPT

## 2024-08-07 PROCEDURE — 82306 VITAMIN D 25 HYDROXY: CPT

## 2024-08-07 PROCEDURE — 80061 LIPID PANEL: CPT

## 2024-10-09 ENCOUNTER — OFFICE VISIT (OUTPATIENT)
Dept: MEDICAL GROUP | Age: 44
End: 2024-10-09
Payer: COMMERCIAL

## 2024-10-09 VITALS
DIASTOLIC BLOOD PRESSURE: 64 MMHG | OXYGEN SATURATION: 95 % | HEART RATE: 83 BPM | WEIGHT: 195 LBS | BODY MASS INDEX: 31.34 KG/M2 | HEIGHT: 66 IN | TEMPERATURE: 97.8 F | SYSTOLIC BLOOD PRESSURE: 110 MMHG

## 2024-10-09 DIAGNOSIS — J06.9 UPPER RESPIRATORY TRACT INFECTION, UNSPECIFIED TYPE: ICD-10-CM

## 2024-10-09 DIAGNOSIS — E55.9 VITAMIN D DEFICIENCY: ICD-10-CM

## 2024-10-09 PROCEDURE — 3078F DIAST BP <80 MM HG: CPT | Performed by: FAMILY MEDICINE

## 2024-10-09 PROCEDURE — 3074F SYST BP LT 130 MM HG: CPT | Performed by: FAMILY MEDICINE

## 2024-10-09 PROCEDURE — 99214 OFFICE O/P EST MOD 30 MIN: CPT | Performed by: FAMILY MEDICINE

## 2024-10-09 RX ORDER — AMOXICILLIN 875 MG/1
875 TABLET, COATED ORAL 2 TIMES DAILY
Qty: 14 TABLET | Refills: 0 | Status: SHIPPED | OUTPATIENT
Start: 2024-10-09

## 2024-10-09 RX ORDER — ERGOCALCIFEROL 1.25 MG/1
50000 CAPSULE ORAL
Qty: 7 CAPSULE | Refills: 0 | Status: SHIPPED | OUTPATIENT
Start: 2024-10-09

## 2024-10-09 RX ORDER — PREDNISONE 20 MG/1
TABLET ORAL
Qty: 12 TABLET | Refills: 0 | Status: SHIPPED | OUTPATIENT
Start: 2024-10-09

## 2024-10-09 RX ORDER — FLUTICASONE PROPIONATE 50 MCG
1 SPRAY, SUSPENSION (ML) NASAL DAILY
Qty: 16 G | Refills: 0 | Status: SHIPPED | OUTPATIENT
Start: 2024-10-09

## 2024-10-09 ASSESSMENT — FIBROSIS 4 INDEX: FIB4 SCORE: 0.53

## 2024-11-08 ENCOUNTER — APPOINTMENT (OUTPATIENT)
Dept: MEDICAL GROUP | Age: 44
End: 2024-11-08
Payer: COMMERCIAL

## 2024-12-26 ENCOUNTER — OFFICE VISIT (OUTPATIENT)
Dept: MEDICAL GROUP | Age: 44
End: 2024-12-26
Payer: COMMERCIAL

## 2024-12-26 VITALS
HEART RATE: 84 BPM | OXYGEN SATURATION: 99 % | HEIGHT: 66 IN | SYSTOLIC BLOOD PRESSURE: 116 MMHG | WEIGHT: 188 LBS | TEMPERATURE: 98.1 F | DIASTOLIC BLOOD PRESSURE: 64 MMHG | RESPIRATION RATE: 20 BRPM | BODY MASS INDEX: 30.22 KG/M2

## 2024-12-26 DIAGNOSIS — M79.642 PAIN IN BOTH HANDS: ICD-10-CM

## 2024-12-26 DIAGNOSIS — J30.2 ACUTE SEASONAL ALLERGIC RHINITIS: ICD-10-CM

## 2024-12-26 DIAGNOSIS — E55.9 VITAMIN D DEFICIENCY: ICD-10-CM

## 2024-12-26 DIAGNOSIS — M79.641 PAIN IN BOTH HANDS: ICD-10-CM

## 2024-12-26 PROCEDURE — 3078F DIAST BP <80 MM HG: CPT | Performed by: INTERNAL MEDICINE

## 2024-12-26 PROCEDURE — 3074F SYST BP LT 130 MM HG: CPT | Performed by: INTERNAL MEDICINE

## 2024-12-26 PROCEDURE — 99214 OFFICE O/P EST MOD 30 MIN: CPT | Performed by: INTERNAL MEDICINE

## 2024-12-26 RX ORDER — MELOXICAM 7.5 MG/1
7.5 TABLET ORAL DAILY
Qty: 7 TABLET | Refills: 0 | Status: SHIPPED | OUTPATIENT
Start: 2024-12-26

## 2024-12-26 ASSESSMENT — ENCOUNTER SYMPTOMS
SPEECH CHANGE: 0
SORE THROAT: 0
SHORTNESS OF BREATH: 0
TREMORS: 0
SENSORY CHANGE: 0
TINGLING: 0

## 2024-12-26 ASSESSMENT — FIBROSIS 4 INDEX: FIB4 SCORE: 0.54

## 2024-12-26 NOTE — PROGRESS NOTES
"CC:   Chief Complaint   Patient presents with    Hand Pain     Both hands, \" My hands hurt on both sides of my hands.\"      Diagnoses of Pain in both hands, Acute seasonal allergic rhinitis, and Vitamin D deficiency were pertinent to this visit.  Verbal consent was acquired by the patient to use [employer] ambient listening note generation during this visit Yes     History of Present Illness  Keith is a pleasant 44 y.o. female who presents today to discuss the following problems:     She reports experiencing pain in her right thumb, which she attributes to an incorrect sleeping position. The pain was alleviated with Tylenol. However, the following day, she experienced bilateral hand pain, particularly when grasping objects. She also reported the onset of left hand soreness last night. She has not engaged in any activities that could have precipitated the pain, such as cooking, cleaning, or typing. She does not participate in sports or gym activities and has no history of falls.  She has no history of gout or arthritis. She has been maintaining adequate hydration over the past few days. She has been managing the pain with Tylenol.    She occasionally takes Claritin for allergies.    She is not currently taking vitamin D supplements, having discontinued them due to perceived side effects. She plans to undergo blood tests to determine her needs.    Supplemental Information  A week ago, she had a high fever accompanied by body shaking, which resolved after 3 to 4 days with Tylenol. . 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 She also reports back pain.    Past Medical History:   Diagnosis Date    Allergy      Current Outpatient Medications Ordered in Epic   Medication Sig Dispense Refill    meloxicam (MOBIC) 7.5 MG Tab Take 1 Tablet by mouth every day. 7 Tablet 0    ergocalciferol (DRISDOL) 13445 UNIT capsule Take 1 Capsule by mouth every 7 days. (Patient not taking: Reported on 12/26/2024) 7 " "Capsule 0     No current [facility]-ordered facility-administered medications on file.     Review of Systems   HENT:  Negative for sore throat.    Respiratory:  Negative for shortness of breath.    Cardiovascular:  Negative for chest pain.   Neurological:  Negative for tingling, tremors, sensory change and speech change.   All other systems reviewed and are negative.    Objective:     Exam:  /64 (BP Location: Right arm, Patient Position: Sitting, BP Cuff Size: Adult)   Pulse 84   Temp 36.7 °C (98.1 °F) (Temporal)   Resp 20   Ht 1.676 m (5' 6\")   Wt 85.3 kg (188 lb)   LMP 12/12/2024 (Approximate)   SpO2 99%   Breastfeeding No   BMI 30.34 kg/m²  Body mass index is 30.34 kg/m².    Physical Exam  Constitutional:       Appearance: Normal appearance.   HENT:      Head: Normocephalic and atraumatic.   Cardiovascular:      Rate and Rhythm: Normal rate and regular rhythm.      Pulses: Normal pulses.      Heart sounds: Normal heart sounds. No murmur heard.  Pulmonary:      Effort: Pulmonary effort is normal. No respiratory distress.      Breath sounds: Normal breath sounds.   Musculoskeletal:      Right wrist: No snuff box tenderness. Normal range of motion.      Left wrist: No snuff box tenderness. Normal range of motion.      Right hand: Tenderness present. Normal range of motion.      Left hand: Normal range of motion.        Hands:       Comments: Negative Tinel and Phalen tests   Neurological:      Mental Status: She is alert.       Assessment & Plan:   Keith  is a pleasant 44 y.o. female with the following -     Assessment & Plan  1. Bilateral hand pain.  The symptoms suggest inflammation in the tendons, potentially due to recent illness or early signs of arthritis from repetitive hand use at work. A prescription for Meloxicam has been provided, with instructions to take it with food to prevent stomach upset. She is advised to use wrist braces on both hands during the day and night to stabilize the wrists " and aid in tendon healing. Hydration is emphasized, recommending at least 64 ounces of water daily. She can apply ice or warm compresses to the affected joints based on what provides more relief. An x-ray of the hands is offered to rule out arthritis but deferred for now. If symptoms do not improve or worsen, she should return for a follow-up visit.    2. Chronic allergies.  She occasionally takes Claritin for allergy symptoms.    3. Vitamin D deficiency.  Chronic. She is not currently taking vitamin D supplements and plans to get blood tests to determine her needs.    Problem List Items Addressed This Visit       Acute seasonal allergic rhinitis    Pain in both hands    Relevant Medications    meloxicam (MOBIC) 7.5 MG Tab    Vitamin D deficiency       Return if symptoms worsen or fail to improve. PRN     Please note that this dictation was created using voice recognition software. I have made every reasonable attempt to correct obvious errors, but I expect that there are errors of grammar and possibly content that I did not discover before finalizing the note.

## 2025-01-02 ENCOUNTER — APPOINTMENT (OUTPATIENT)
Dept: MEDICAL GROUP | Age: 45
End: 2025-01-02
Payer: COMMERCIAL

## 2025-01-02 VITALS
WEIGHT: 188 LBS | TEMPERATURE: 97.7 F | BODY MASS INDEX: 30.22 KG/M2 | OXYGEN SATURATION: 97 % | HEIGHT: 66 IN | DIASTOLIC BLOOD PRESSURE: 62 MMHG | SYSTOLIC BLOOD PRESSURE: 120 MMHG | HEART RATE: 116 BPM

## 2025-01-02 DIAGNOSIS — M79.644 BILATERAL THUMB PAIN: ICD-10-CM

## 2025-01-02 DIAGNOSIS — M79.645 BILATERAL THUMB PAIN: ICD-10-CM

## 2025-01-02 PROCEDURE — 3078F DIAST BP <80 MM HG: CPT | Performed by: FAMILY MEDICINE

## 2025-01-02 PROCEDURE — 99214 OFFICE O/P EST MOD 30 MIN: CPT | Performed by: FAMILY MEDICINE

## 2025-01-02 PROCEDURE — 3074F SYST BP LT 130 MM HG: CPT | Performed by: FAMILY MEDICINE

## 2025-01-02 RX ORDER — MELOXICAM 15 MG/1
15 TABLET ORAL DAILY
Qty: 30 TABLET | Refills: 2 | Status: SHIPPED | OUTPATIENT
Start: 2025-01-02

## 2025-01-02 ASSESSMENT — FIBROSIS 4 INDEX: FIB4 SCORE: 0.54

## 2025-01-02 ASSESSMENT — PATIENT HEALTH QUESTIONNAIRE - PHQ9: CLINICAL INTERPRETATION OF PHQ2 SCORE: 0

## 2025-01-02 NOTE — PROGRESS NOTES
This medical record contains text that has been entered with the assistance of computer voice recognition and dictation software.  Therefore, it may contain unintended errors in text, spelling, punctuation, or grammar      Verbal consent was acquired by the patient to use [employer] ambient listening note generation during this visit Yes       Chief Complaint   Patient presents with    Follow-Up     Patient states that Tuesday her thumb joints she did get medication but states its not working          Keith Carrasco is a 44 y.o. female here evaluation and management of: thumb pain      HPI:           1. Bilateral thumb pain    Patient is a very pleasant 44-year-old female who presents to clinic with a chief complaint of having bilateral thumb pain sometimes hand pain.  She states the pain is worse usually in the morning and they are stiff it tends to improve as the day goes on or she gets some warmth.  She is also noticed some weakness in her lower extremities when she tries to sit up she can feel some heaviness in her legs she states.  She was seen earlier for this hand pain she was given Mobic 7.5 mg but she states it did not help.  She denies any loss of bladder or bowel function, no unintentional weight loss, no new rashes.    Current medicines (including changes today)  Current Outpatient Medications   Medication Sig Dispense Refill    meloxicam (MOBIC) 15 MG tablet Take 1 Tablet by mouth every day. 30 Tablet 2    meloxicam (MOBIC) 7.5 MG Tab Take 1 Tablet by mouth every day. 7 Tablet 0     No current facility-administered medications for this visit.     She  has a past medical history of Allergy.  She  has a past surgical history that includes primary c section (2007) and repeat c section (12/30/2010).  Social History     Tobacco Use    Smoking status: Never    Smokeless tobacco: Never   Vaping Use    Vaping status: Never Used   Substance Use Topics    Alcohol use: No    Drug use: No     Social History  "    Social History Narrative    Not on file     Family History   Problem Relation Age of Onset    No Known Problems Mother     No Known Problems Father      Family Status   Relation Name Status    Mo  Alive    Fa  Alive   No partnership data on file         ROS    The pertinent  ROS findings can be seen in the HPI above.     All other systems reviewed and are negative     Objective:     /62 (BP Location: Right arm, Patient Position: Sitting, BP Cuff Size: Adult)   Pulse (!) 116   Temp 36.5 °C (97.7 °F) (Temporal)   Ht 1.676 m (5' 6\")   Wt 85.3 kg (188 lb)   SpO2 97%  Body mass index is 30.34 kg/m².      Physical Exam:    Constitutional: Alert, no distress.  Skin: No suspicious lesions  Eye: Equal, round and reactive, conjunctiva clear, lids normal.  ENMT: Lips without lesions, good dentition, oropharynx clear.  Neck: Trachea midline, no masses, no thyromegaly. No cervical or supraclavicular lymphadenopathy.  Respiratory: Unlabored respiratory effort, lungs clear to auscultation, no wheezes, no ronchi.  Cardiovascular: Normal S1, S2, no murmur, no edema  Abdomen: Soft, non-tender, no masses, no hepatosplenomegaly.        Assessment and Plan:   The following treatment plan was discussed    All recent labs and provider notes reviewed    1. Bilateral thumb pain    The pain persist so we will proceed with plain film of bilateral hands and also obtain an autoimmune panel.  In the meantime we will increase Mobic to 15 mg    - DX-HAND 3+ RIGHT; Future  - DX-HAND 3+ LEFT; Future  - JOY REFLEXIVE PROFILE; Future  - RHEUMATOID ARTHRITIS FACTOR; Future  - CCP-CYCLIC CITRULLINATED PEPTID; Future  - SMITH AB IGG; Future  - ANTI-DNA (DS); Future  - meloxicam (MOBIC) 15 MG tablet; Take 1 Tablet by mouth every day.  Dispense: 30 Tablet; Refill: 2             Instructed to Follow up in clinic or ER for worsening symptoms, difficulty breathing, lack of expected recovery, or should new symptoms or problems " arise.    Followup: Return in about 3 months (around 4/2/2025) for Reevaluation, labs.

## 2025-01-03 ENCOUNTER — APPOINTMENT (OUTPATIENT)
Dept: RADIOLOGY | Facility: MEDICAL CENTER | Age: 45
End: 2025-01-03
Attending: FAMILY MEDICINE
Payer: COMMERCIAL

## 2025-01-03 ENCOUNTER — HOSPITAL ENCOUNTER (OUTPATIENT)
Dept: LAB | Facility: MEDICAL CENTER | Age: 45
End: 2025-01-03
Attending: FAMILY MEDICINE
Payer: COMMERCIAL

## 2025-01-03 DIAGNOSIS — M79.644 BILATERAL THUMB PAIN: ICD-10-CM

## 2025-01-03 DIAGNOSIS — M79.645 BILATERAL THUMB PAIN: ICD-10-CM

## 2025-01-03 PROCEDURE — 86431 RHEUMATOID FACTOR QUANT: CPT

## 2025-01-03 PROCEDURE — 36415 COLL VENOUS BLD VENIPUNCTURE: CPT

## 2025-01-03 PROCEDURE — 86200 CCP ANTIBODY: CPT

## 2025-01-03 PROCEDURE — 86225 DNA ANTIBODY NATIVE: CPT

## 2025-01-03 PROCEDURE — 86038 ANTINUCLEAR ANTIBODIES: CPT

## 2025-01-03 PROCEDURE — 86039 ANTINUCLEAR ANTIBODIES (ANA): CPT

## 2025-01-03 PROCEDURE — 73130 X-RAY EXAM OF HAND: CPT | Mod: LT

## 2025-01-03 PROCEDURE — 73130 X-RAY EXAM OF HAND: CPT | Mod: RT

## 2025-01-03 PROCEDURE — 86235 NUCLEAR ANTIGEN ANTIBODY: CPT

## 2025-01-04 LAB — RHEUMATOID FACT SER IA-ACNC: <10 IU/ML (ref 0–14)

## 2025-01-05 LAB
CCP IGA+IGG SERPL IA-ACNC: 4 UNITS (ref 0–19)
DSDNA AB TITR SER CLIF: NORMAL {TITER}
ENA SM IGG SER-ACNC: 1 AU/ML (ref 0–40)
NUCLEAR IGG SER QL IA: DETECTED

## 2025-01-07 LAB
ANA PAT SER IF-IMP: NORMAL
NUCLEAR IGG SER QL IF: NORMAL

## 2025-01-15 ENCOUNTER — OFFICE VISIT (OUTPATIENT)
Dept: MEDICAL GROUP | Age: 45
End: 2025-01-15
Payer: COMMERCIAL

## 2025-01-15 VITALS
HEART RATE: 89 BPM | SYSTOLIC BLOOD PRESSURE: 110 MMHG | OXYGEN SATURATION: 98 % | DIASTOLIC BLOOD PRESSURE: 70 MMHG | TEMPERATURE: 97.9 F | HEIGHT: 66 IN | BODY MASS INDEX: 30.37 KG/M2 | WEIGHT: 189 LBS

## 2025-01-15 DIAGNOSIS — M79.641 PAIN IN BOTH HANDS: ICD-10-CM

## 2025-01-15 DIAGNOSIS — E78.2 MIXED HYPERLIPIDEMIA: ICD-10-CM

## 2025-01-15 DIAGNOSIS — M62.89 MUSCLE STIFFNESS: ICD-10-CM

## 2025-01-15 DIAGNOSIS — E55.9 VITAMIN D DEFICIENCY: ICD-10-CM

## 2025-01-15 DIAGNOSIS — M79.642 PAIN IN BOTH HANDS: ICD-10-CM

## 2025-01-15 PROCEDURE — 99214 OFFICE O/P EST MOD 30 MIN: CPT | Performed by: FAMILY MEDICINE

## 2025-01-15 PROCEDURE — 3074F SYST BP LT 130 MM HG: CPT | Performed by: FAMILY MEDICINE

## 2025-01-15 PROCEDURE — 3078F DIAST BP <80 MM HG: CPT | Performed by: FAMILY MEDICINE

## 2025-01-15 RX ORDER — CYCLOBENZAPRINE HCL 10 MG
10 TABLET ORAL
Qty: 30 TABLET | Refills: 0 | Status: SHIPPED | OUTPATIENT
Start: 2025-01-15

## 2025-01-15 ASSESSMENT — FIBROSIS 4 INDEX: FIB4 SCORE: 0.54

## 2025-01-16 NOTE — PROGRESS NOTES
This medical record contains text that has been entered with the assistance of computer voice recognition and dictation software.  Therefore, it may contain unintended errors in text, spelling, punctuation, or grammar      Verbal consent was acquired by the patient to use [employer] ambient listening note generation during this visit Yes       Chief Complaint   Patient presents with    Finger Pain     Thumb and Hand pain when patient is moving she is fine but when she stops it hurts and at night time feels the tingling-- shoulder and back pain as well         Keith Carrasco is a 44 y.o. female here evaluation and management of: Muscle stiffness joint pain      HPI:           1. Muscle stiffness  2. Pain in both hands  The patient is a 44-year-old female who presents to clinic with a chief complaint of having muscle stiffness mainly in the neck sometimes bilateral thighs.  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  She continues to have muscle stiffness in the neck and back she saw her chiropractor she states she had relief for 2 days but then it came back.  The meloxicam 15 mg did help but she stopped taking it because she knew on take a pill every day.      Current medicines (including changes today)  Current Outpatient Medications   Medication Sig Dispense Refill    cyclobenzaprine (FLEXERIL) 10 mg Tab Take 1 Tablet by mouth at bedtime. 30 Tablet 0    meloxicam (MOBIC) 15 MG tablet Take 1 Tablet by mouth every day. 30 Tablet 2     No current facility-administered medications for this visit.     She  has a past medical history of Allergy.  She  has a past surgical history that includes primary c section (2007) and repeat c section (12/30/2010).  Social History     Tobacco Use    Smoking status: Never    Smokeless tobacco: Never   Vaping Use    Vaping status: Never Used  "  Substance Use Topics    Alcohol use: No    Drug use: No     Social History     Social History Narrative    Not on file     Family History   Problem Relation Age of Onset    No Known Problems Mother     No Known Problems Father      Family Status   Relation Name Status    Mo  Alive    Fa  Alive   No partnership data on file         ROS    The pertinent  ROS findings can be seen in the HPI above.     All other systems reviewed and are negative     Objective:     /70 (BP Location: Right arm, Patient Position: Sitting, BP Cuff Size: Large adult)   Pulse 89   Temp 36.6 °C (97.9 °F) (Temporal)   Ht 1.676 m (5' 6\")   Wt 85.7 kg (189 lb)   SpO2 98%  Body mass index is 30.51 kg/m².      Physical Exam:    Constitutional: Alert, no distress.  Skin: No suspicious lesions  Eye: Equal, round and reactive, conjunctiva clear, lids normal.  ENMT: Lips without lesions, good dentition, oropharynx clear.  Neck: Trachea midline, no masses, no thyromegaly. No cervical or supraclavicular lymphadenopathy.  Respiratory: Unlabored respiratory effort, lungs clear to auscultation, no wheezes, no ronchi.  Cardiovascular: Normal S1, S2, no murmur, no edema  Abdomen: Soft, non-tender, no masses, no hepatosplenomegaly.        Assessment and Plan:   The following treatment plan was discussed    All recent labs and provider notes reviewed    Assessment & Plan  1. Back pain.  She reports persistent back pain, particularly in the medial tibia. She has been taking meloxicam, which provides some relief but not complete resolution of symptoms. A referral for physical therapy has been initiated to address her back pain through stretching exercises. Blood work will be conducted to assess her vitamin D levels, which have been low in the past. She is advised to continue her current medication regimen and incorporate stretching exercises into her routine.        1. Muscle stiffness  - cyclobenzaprine (FLEXERIL) 10 mg Tab; Take 1 Tablet by mouth " at bedtime.  Dispense: 30 Tablet; Refill: 0  - Referral to Physical Therapy    2. Pain in both hands    3. Mixed hyperlipidemia  - CBC WITH DIFFERENTIAL; Future  - Comp Metabolic Panel; Future  - Lipid Profile; Future  - TSH+FREE T4  - T3 FREE; Future    4. Vitamin D deficiency  - VITAMIN D,25 HYDROXY (DEFICIENCY); Future             Instructed to Follow up in clinic or ER for worsening symptoms, difficulty breathing, lack of expected recovery, or should new symptoms or problems arise.    Followup: Return in about 3 months (around 4/15/2025) for Reevaluation, labs.

## 2025-02-20 ENCOUNTER — APPOINTMENT (OUTPATIENT)
Dept: MEDICAL GROUP | Age: 45
End: 2025-02-20
Payer: COMMERCIAL

## 2025-02-26 ENCOUNTER — OFFICE VISIT (OUTPATIENT)
Dept: MEDICAL GROUP | Age: 45
End: 2025-02-26
Payer: COMMERCIAL

## 2025-02-26 VITALS
BODY MASS INDEX: 30.53 KG/M2 | HEIGHT: 66 IN | TEMPERATURE: 97.7 F | OXYGEN SATURATION: 100 % | SYSTOLIC BLOOD PRESSURE: 120 MMHG | HEART RATE: 100 BPM | WEIGHT: 190 LBS | DIASTOLIC BLOOD PRESSURE: 68 MMHG

## 2025-02-26 DIAGNOSIS — Z12.31 VISIT FOR SCREENING MAMMOGRAM: ICD-10-CM

## 2025-02-26 DIAGNOSIS — T78.40XD ALLERGY, SUBSEQUENT ENCOUNTER: ICD-10-CM

## 2025-02-26 DIAGNOSIS — L30.9 DERMATITIS: ICD-10-CM

## 2025-02-26 PROBLEM — T78.40XA ALLERGY: Status: ACTIVE | Noted: 2025-02-26

## 2025-02-26 RX ORDER — CETIRIZINE HYDROCHLORIDE 10 MG/1
10 TABLET ORAL DAILY
Qty: 90 TABLET | Refills: 0 | Status: SHIPPED | OUTPATIENT
Start: 2025-02-26

## 2025-02-26 RX ORDER — HYDROXYZINE HYDROCHLORIDE 25 MG/1
25 TABLET, FILM COATED ORAL 3 TIMES DAILY PRN
Qty: 30 TABLET | Refills: 0 | Status: SHIPPED | OUTPATIENT
Start: 2025-02-26

## 2025-02-26 ASSESSMENT — FIBROSIS 4 INDEX: FIB4 SCORE: 0.54

## 2025-02-26 NOTE — PROGRESS NOTES
This medical record contains text that has been entered with the assistance of computer voice recognition and dictation software.  Therefore, it may contain unintended errors in text, spelling, punctuation, or grammar      Verbal consent was acquired by the patient to use [employer] ambient listening note generation during this visit Yes       Chief Complaint   Patient presents with    Rash     Itching all over body and in specific spots    Numbness     Right Hand goes numb when she positions it differently         Keith Carrasco is a 44 y.o. female here evaluation and management of: rash      HPI:           1. Dermatitis      2. Allergy, subsequent encounter      3. Visit for screening mammogram    The patient states that she has been getting an itchy rash mainly on her abdomen arms and sometimes thighs.  She thinks she may be allergic to something she will take a Claritin once or twice a week seems to help but not completely.  She states she already has an appointment with dermatology but needs a referral.  She denies ever having shortness of breath tongue swelling or throat itching during these allergic reactions.  She is also due for her mammogram.    Current medicines (including changes today)  Current Outpatient Medications   Medication Sig Dispense Refill    hydrOXYzine HCl (ATARAX) 25 MG Tab Take 1 Tablet by mouth 3 times a day as needed for Itching. 30 Tablet 0    cetirizine (ZYRTEC) 10 MG Tab Take 1 Tablet by mouth every day. 90 Tablet 0    cyclobenzaprine (FLEXERIL) 10 mg Tab Take 1 Tablet by mouth at bedtime. 30 Tablet 0    meloxicam (MOBIC) 15 MG tablet Take 1 Tablet by mouth every day. 30 Tablet 2     No current facility-administered medications for this visit.     She  has a past medical history of Allergy.  She  has a past surgical history that includes primary c section (2007) and repeat c section (12/30/2010).  Social History     Tobacco Use    Smoking status: Never    Smokeless tobacco: Never  "  Vaping Use    Vaping status: Never Used   Substance Use Topics    Alcohol use: No    Drug use: No     Social History     Social History Narrative    Not on file     Family History   Problem Relation Age of Onset    No Known Problems Mother     No Known Problems Father      Family Status   Relation Name Status    Mo  Alive    Fa  Alive   No partnership data on file         ROS    The pertinent  ROS findings can be seen in the HPI above.     All other systems reviewed and are negative     Objective:     /68 (BP Location: Left arm, Patient Position: Sitting, BP Cuff Size: Large adult)   Pulse 100   Temp 36.5 °C (97.7 °F) (Temporal)   Ht 1.676 m (5' 6\")   Wt 86.2 kg (190 lb)   SpO2 100%  Body mass index is 30.67 kg/m².      Physical Exam:    Constitutional: Alert, no distress.  Skin: No suspicious lesions  Eye: Equal, round and reactive, conjunctiva clear, lids normal.  ENMT: Lips without lesions, good dentition, oropharynx clear.  Neck: Trachea midline, no masses, no thyromegaly. No cervical or supraclavicular lymphadenopathy.  Respiratory: Unlabored respiratory effort, lungs clear to auscultation, no wheezes, no ronchi.  Cardiovascular: Normal S1, S2, no murmur, no edema  Abdomen: Soft, non-tender, no masses, no hepatosplenomegaly.        Assessment and Plan:   The following treatment plan was discussed    All recent labs and provider notes reviewed    Assessment & Plan  1. Allergies.  She reports experiencing red spots and itching in several different areas including the arms and abdomen, possibly due to an allergic reaction.        1. Dermatitis    2. Allergy, subsequent encounter  - Referral to Dermatology  - Referral to Allergy  - hydrOXYzine HCl (ATARAX) 25 MG Tab; Take 1 Tablet by mouth 3 times a day as needed for Itching.  Dispense: 30 Tablet; Refill: 0  - cetirizine (ZYRTEC) 10 MG Tab; Take 1 Tablet by mouth every day.  Dispense: 90 Tablet; Refill: 0    3. Visit for screening mammogram  - " MA-SCREENING MAMMO BILAT W/TOMOSYNTHESIS W/CAD; Future             Instructed to Follow up in clinic or ER for worsening symptoms, difficulty breathing, lack of expected recovery, or should new symptoms or problems arise.    Followup: Return in about 6 months (around 8/26/2025) for Reevaluation, labs.

## 2025-03-13 ENCOUNTER — APPOINTMENT (OUTPATIENT)
Dept: MEDICAL GROUP | Age: 45
End: 2025-03-13
Payer: COMMERCIAL

## 2025-03-14 ENCOUNTER — APPOINTMENT (OUTPATIENT)
Dept: RADIOLOGY | Facility: MEDICAL CENTER | Age: 45
End: 2025-03-14
Attending: FAMILY MEDICINE
Payer: COMMERCIAL

## 2025-04-04 ENCOUNTER — HOSPITAL ENCOUNTER (OUTPATIENT)
Dept: RADIOLOGY | Facility: MEDICAL CENTER | Age: 45
End: 2025-04-04
Attending: FAMILY MEDICINE
Payer: COMMERCIAL

## 2025-04-04 DIAGNOSIS — Z12.31 VISIT FOR SCREENING MAMMOGRAM: ICD-10-CM

## 2025-04-04 PROCEDURE — 77067 SCR MAMMO BI INCL CAD: CPT

## 2025-04-08 ENCOUNTER — RESULTS FOLLOW-UP (OUTPATIENT)
Dept: MEDICAL GROUP | Age: 45
End: 2025-04-08

## 2025-05-09 ENCOUNTER — HOSPITAL ENCOUNTER (OUTPATIENT)
Dept: LAB | Facility: MEDICAL CENTER | Age: 45
End: 2025-05-09
Attending: FAMILY MEDICINE
Payer: COMMERCIAL

## 2025-05-09 DIAGNOSIS — E55.9 VITAMIN D DEFICIENCY: ICD-10-CM

## 2025-05-09 DIAGNOSIS — E78.2 MIXED HYPERLIPIDEMIA: ICD-10-CM

## 2025-05-09 LAB
25(OH)D3 SERPL-MCNC: 22 NG/ML (ref 30–100)
ALBUMIN SERPL BCP-MCNC: 3.8 G/DL (ref 3.2–4.9)
ALBUMIN/GLOB SERPL: 1.4 G/DL
ALP SERPL-CCNC: 61 U/L (ref 30–99)
ALT SERPL-CCNC: 14 U/L (ref 2–50)
ANION GAP SERPL CALC-SCNC: 5 MMOL/L (ref 7–16)
AST SERPL-CCNC: 16 U/L (ref 12–45)
BASOPHILS # BLD AUTO: 1.1 % (ref 0–1.8)
BASOPHILS # BLD: 0.08 K/UL (ref 0–0.12)
BILIRUB SERPL-MCNC: 0.3 MG/DL (ref 0.1–1.5)
BUN SERPL-MCNC: 11 MG/DL (ref 8–22)
CALCIUM ALBUM COR SERPL-MCNC: 9.1 MG/DL (ref 8.5–10.5)
CALCIUM SERPL-MCNC: 8.9 MG/DL (ref 8.5–10.5)
CHLORIDE SERPL-SCNC: 104 MMOL/L (ref 96–112)
CHOLEST SERPL-MCNC: 163 MG/DL (ref 100–199)
CO2 SERPL-SCNC: 27 MMOL/L (ref 20–33)
CREAT SERPL-MCNC: 0.79 MG/DL (ref 0.5–1.4)
EOSINOPHIL # BLD AUTO: 1.58 K/UL (ref 0–0.51)
EOSINOPHIL NFR BLD: 21.2 % (ref 0–6.9)
ERYTHROCYTE [DISTWIDTH] IN BLOOD BY AUTOMATED COUNT: 48.7 FL (ref 35.9–50)
FASTING STATUS PATIENT QL REPORTED: NORMAL
GFR SERPLBLD CREATININE-BSD FMLA CKD-EPI: 95 ML/MIN/1.73 M 2
GLOBULIN SER CALC-MCNC: 2.8 G/DL (ref 1.9–3.5)
GLUCOSE SERPL-MCNC: 89 MG/DL (ref 65–99)
HCT VFR BLD AUTO: 41.4 % (ref 37–47)
HDLC SERPL-MCNC: 56 MG/DL
HGB BLD-MCNC: 13.5 G/DL (ref 12–16)
IMM GRANULOCYTES # BLD AUTO: 0.02 K/UL (ref 0–0.11)
IMM GRANULOCYTES NFR BLD AUTO: 0.3 % (ref 0–0.9)
LDLC SERPL CALC-MCNC: 95 MG/DL
LYMPHOCYTES # BLD AUTO: 2.42 K/UL (ref 1–4.8)
LYMPHOCYTES NFR BLD: 32.4 % (ref 22–41)
MCH RBC QN AUTO: 30.7 PG (ref 27–33)
MCHC RBC AUTO-ENTMCNC: 32.6 G/DL (ref 32.2–35.5)
MCV RBC AUTO: 94.1 FL (ref 81.4–97.8)
MONOCYTES # BLD AUTO: 0.46 K/UL (ref 0–0.85)
MONOCYTES NFR BLD AUTO: 6.2 % (ref 0–13.4)
NEUTROPHILS # BLD AUTO: 2.9 K/UL (ref 1.82–7.42)
NEUTROPHILS NFR BLD: 38.8 % (ref 44–72)
NRBC # BLD AUTO: 0 K/UL
NRBC BLD-RTO: 0 /100 WBC (ref 0–0.2)
PLATELET # BLD AUTO: 354 K/UL (ref 164–446)
PMV BLD AUTO: 11.2 FL (ref 9–12.9)
POTASSIUM SERPL-SCNC: 4.1 MMOL/L (ref 3.6–5.5)
PROT SERPL-MCNC: 6.6 G/DL (ref 6–8.2)
RBC # BLD AUTO: 4.4 M/UL (ref 4.2–5.4)
SODIUM SERPL-SCNC: 136 MMOL/L (ref 135–145)
T3FREE SERPL-MCNC: 2.97 PG/ML (ref 2–4.4)
T4 FREE SERPL-MCNC: 0.96 NG/DL (ref 0.93–1.7)
TRIGL SERPL-MCNC: 60 MG/DL (ref 0–149)
TSH SERPL-ACNC: 2.14 UIU/ML (ref 0.38–5.33)
WBC # BLD AUTO: 7.5 K/UL (ref 4.8–10.8)

## 2025-05-09 PROCEDURE — 80053 COMPREHEN METABOLIC PANEL: CPT

## 2025-05-09 PROCEDURE — 80061 LIPID PANEL: CPT

## 2025-05-09 PROCEDURE — 36415 COLL VENOUS BLD VENIPUNCTURE: CPT

## 2025-05-09 PROCEDURE — 82306 VITAMIN D 25 HYDROXY: CPT

## 2025-05-09 PROCEDURE — 84481 FREE ASSAY (FT-3): CPT

## 2025-05-09 PROCEDURE — 85025 COMPLETE CBC W/AUTO DIFF WBC: CPT

## 2025-05-09 PROCEDURE — 84439 ASSAY OF FREE THYROXINE: CPT

## 2025-05-09 PROCEDURE — 84443 ASSAY THYROID STIM HORMONE: CPT

## 2025-05-13 ENCOUNTER — RESULTS FOLLOW-UP (OUTPATIENT)
Dept: MEDICAL GROUP | Age: 45
End: 2025-05-13